# Patient Record
Sex: MALE | URBAN - METROPOLITAN AREA
[De-identification: names, ages, dates, MRNs, and addresses within clinical notes are randomized per-mention and may not be internally consistent; named-entity substitution may affect disease eponyms.]

---
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
## 2019-07-23 NOTE — ED PROVIDER NOTE - GASTROINTESTINAL, MLM
Abdomen soft, non-tender, no guarding. V-Y Flap Text: The defect edges were debeveled with a #15 scalpel blade.  Given the location of the defect, shape of the defect and the proximity to free margins a V-Y flap was deemed most appropriate.  Using a sterile surgical marker, an appropriate advancement flap was drawn incorporating the defect and placing the expected incisions within the relaxed skin tension lines where possible.    The area thus outlined was incised deep to adipose tissue with a #15 scalpel blade.  The skin margins were undermined to an appropriate distance in all directions utilizing iris scissors.

## 2019-09-26 ENCOUNTER — EMERGENCY (EMERGENCY)
Facility: HOSPITAL | Age: 19
LOS: 1 days | Discharge: HOME | End: 2019-09-26
Attending: EMERGENCY MEDICINE | Admitting: EMERGENCY MEDICINE
Payer: MEDICAID

## 2019-09-26 VITALS
HEART RATE: 67 BPM | SYSTOLIC BLOOD PRESSURE: 106 MMHG | HEIGHT: 62 IN | RESPIRATION RATE: 16 BRPM | OXYGEN SATURATION: 99 % | TEMPERATURE: 98 F | WEIGHT: 130.07 LBS | DIASTOLIC BLOOD PRESSURE: 73 MMHG

## 2019-09-26 VITALS
SYSTOLIC BLOOD PRESSURE: 115 MMHG | OXYGEN SATURATION: 100 % | RESPIRATION RATE: 16 BRPM | HEART RATE: 68 BPM | DIASTOLIC BLOOD PRESSURE: 68 MMHG | TEMPERATURE: 97 F

## 2019-09-26 LAB
ALBUMIN SERPL ELPH-MCNC: 4 G/DL — SIGNIFICANT CHANGE UP (ref 3.3–5)
ALP SERPL-CCNC: 110 U/L — SIGNIFICANT CHANGE UP (ref 40–120)
ALT FLD-CCNC: 49 U/L — SIGNIFICANT CHANGE UP (ref 12–78)
ANION GAP SERPL CALC-SCNC: 8 MMOL/L — SIGNIFICANT CHANGE UP (ref 5–17)
APPEARANCE UR: CLEAR — SIGNIFICANT CHANGE UP
AST SERPL-CCNC: 56 U/L — HIGH (ref 15–37)
BASOPHILS # BLD AUTO: 0.1 K/UL — SIGNIFICANT CHANGE UP (ref 0–0.2)
BASOPHILS NFR BLD AUTO: 1.7 % — SIGNIFICANT CHANGE UP (ref 0–2)
BILIRUB SERPL-MCNC: 0.3 MG/DL — SIGNIFICANT CHANGE UP (ref 0.2–1.2)
BILIRUB UR-MCNC: NEGATIVE — SIGNIFICANT CHANGE UP
BUN SERPL-MCNC: 9 MG/DL — SIGNIFICANT CHANGE UP (ref 7–23)
CALCIUM SERPL-MCNC: 8.5 MG/DL — SIGNIFICANT CHANGE UP (ref 8.5–10.1)
CHLORIDE SERPL-SCNC: 106 MMOL/L — SIGNIFICANT CHANGE UP (ref 96–108)
CO2 SERPL-SCNC: 25 MMOL/L — SIGNIFICANT CHANGE UP (ref 22–31)
COLOR SPEC: SIGNIFICANT CHANGE UP
CREAT SERPL-MCNC: 0.64 MG/DL — SIGNIFICANT CHANGE UP (ref 0.5–1.3)
DIFF PNL FLD: NEGATIVE — SIGNIFICANT CHANGE UP
EOSINOPHIL # BLD AUTO: 0.47 K/UL — SIGNIFICANT CHANGE UP (ref 0–0.5)
EOSINOPHIL NFR BLD AUTO: 7.8 % — HIGH (ref 0–6)
GLUCOSE SERPL-MCNC: 94 MG/DL — SIGNIFICANT CHANGE UP (ref 70–99)
GLUCOSE UR QL: NEGATIVE — SIGNIFICANT CHANGE UP
HCT VFR BLD CALC: 40.1 % — SIGNIFICANT CHANGE UP (ref 39–50)
HGB BLD-MCNC: 13.7 G/DL — SIGNIFICANT CHANGE UP (ref 13–17)
IMM GRANULOCYTES NFR BLD AUTO: 0.3 % — SIGNIFICANT CHANGE UP (ref 0–1.5)
KETONES UR-MCNC: NEGATIVE — SIGNIFICANT CHANGE UP
LEUKOCYTE ESTERASE UR-ACNC: NEGATIVE — SIGNIFICANT CHANGE UP
LYMPHOCYTES # BLD AUTO: 2.27 K/UL — SIGNIFICANT CHANGE UP (ref 1–3.3)
LYMPHOCYTES # BLD AUTO: 37.6 % — SIGNIFICANT CHANGE UP (ref 13–44)
MCHC RBC-ENTMCNC: 31.9 PG — SIGNIFICANT CHANGE UP (ref 27–34)
MCHC RBC-ENTMCNC: 34.2 GM/DL — SIGNIFICANT CHANGE UP (ref 32–36)
MCV RBC AUTO: 93.3 FL — SIGNIFICANT CHANGE UP (ref 80–100)
MONOCYTES # BLD AUTO: 0.42 K/UL — SIGNIFICANT CHANGE UP (ref 0–0.9)
MONOCYTES NFR BLD AUTO: 7 % — SIGNIFICANT CHANGE UP (ref 2–14)
NEUTROPHILS # BLD AUTO: 2.76 K/UL — SIGNIFICANT CHANGE UP (ref 1.8–7.4)
NEUTROPHILS NFR BLD AUTO: 45.6 % — SIGNIFICANT CHANGE UP (ref 43–77)
NITRITE UR-MCNC: NEGATIVE — SIGNIFICANT CHANGE UP
NRBC # BLD: 0 /100 WBCS — SIGNIFICANT CHANGE UP (ref 0–0)
PH UR: 7 — SIGNIFICANT CHANGE UP (ref 5–8)
PLATELET # BLD AUTO: 276 K/UL — SIGNIFICANT CHANGE UP (ref 150–400)
POTASSIUM SERPL-MCNC: 4 MMOL/L — SIGNIFICANT CHANGE UP (ref 3.5–5.3)
POTASSIUM SERPL-SCNC: 4 MMOL/L — SIGNIFICANT CHANGE UP (ref 3.5–5.3)
PROT SERPL-MCNC: 7.3 G/DL — SIGNIFICANT CHANGE UP (ref 6–8.3)
PROT UR-MCNC: NEGATIVE — SIGNIFICANT CHANGE UP
RBC # BLD: 4.3 M/UL — SIGNIFICANT CHANGE UP (ref 4.2–5.8)
RBC # FLD: 12.2 % — SIGNIFICANT CHANGE UP (ref 10.3–14.5)
SODIUM SERPL-SCNC: 139 MMOL/L — SIGNIFICANT CHANGE UP (ref 135–145)
SP GR SPEC: 1 — LOW (ref 1.01–1.02)
UROBILINOGEN FLD QL: NEGATIVE — SIGNIFICANT CHANGE UP
WBC # BLD: 6.04 K/UL — SIGNIFICANT CHANGE UP (ref 3.8–10.5)
WBC # FLD AUTO: 6.04 K/UL — SIGNIFICANT CHANGE UP (ref 3.8–10.5)

## 2019-09-26 PROCEDURE — 99284 EMERGENCY DEPT VISIT MOD MDM: CPT

## 2019-09-26 PROCEDURE — 96361 HYDRATE IV INFUSION ADD-ON: CPT

## 2019-09-26 PROCEDURE — 99284 EMERGENCY DEPT VISIT MOD MDM: CPT | Mod: 25

## 2019-09-26 PROCEDURE — 81003 URINALYSIS AUTO W/O SCOPE: CPT

## 2019-09-26 PROCEDURE — 74177 CT ABD & PELVIS W/CONTRAST: CPT | Mod: 26

## 2019-09-26 PROCEDURE — 74177 CT ABD & PELVIS W/CONTRAST: CPT

## 2019-09-26 PROCEDURE — 80053 COMPREHEN METABOLIC PANEL: CPT

## 2019-09-26 PROCEDURE — 96374 THER/PROPH/DIAG INJ IV PUSH: CPT

## 2019-09-26 PROCEDURE — 85027 COMPLETE CBC AUTOMATED: CPT

## 2019-09-26 PROCEDURE — 36415 COLL VENOUS BLD VENIPUNCTURE: CPT

## 2019-09-26 RX ORDER — KETOROLAC TROMETHAMINE 30 MG/ML
30 SYRINGE (ML) INJECTION ONCE
Refills: 0 | Status: DISCONTINUED | OUTPATIENT
Start: 2019-09-26 | End: 2019-09-26

## 2019-09-26 RX ORDER — SODIUM CHLORIDE 9 MG/ML
1000 INJECTION INTRAMUSCULAR; INTRAVENOUS; SUBCUTANEOUS ONCE
Refills: 0 | Status: COMPLETED | OUTPATIENT
Start: 2019-09-26 | End: 2019-09-26

## 2019-09-26 RX ADMIN — SODIUM CHLORIDE 1000 MILLILITER(S): 9 INJECTION INTRAMUSCULAR; INTRAVENOUS; SUBCUTANEOUS at 18:20

## 2019-09-26 RX ADMIN — SODIUM CHLORIDE 1000 MILLILITER(S): 9 INJECTION INTRAMUSCULAR; INTRAVENOUS; SUBCUTANEOUS at 17:20

## 2019-09-26 RX ADMIN — Medication 30 MILLIGRAM(S): at 17:22

## 2019-09-26 RX ADMIN — Medication 30 MILLIGRAM(S): at 17:35

## 2019-09-26 NOTE — ED PROVIDER NOTE - CLINICAL SUMMARY MEDICAL DECISION MAKING FREE TEXT BOX
nonspecific abdominal pain, labs, ct no acute findings, given pain med, symptoms resolved, follow up with pmd and   GI

## 2019-09-26 NOTE — ED PROVIDER NOTE - PROGRESS NOTE DETAILS
patient resting comfortably , results discussed no acute findings, given information for Dr Krishnamurthy, advised follow up with pmd, copy of results given, any concerns return to ED

## 2019-09-26 NOTE — ED ADULT NURSE NOTE - PMH
Fracture of tibia, closed    No pertinent past medical history Blood clot in bladder    Fracture of tibia, closed    No pertinent past medical history

## 2019-09-26 NOTE — ED ADULT NURSE NOTE - CHPI ED NUR SYMPTOMS NEG
no nausea/no hematuria/no blood in stool/no fever/no burning urination/no vomiting/no diarrhea/no chills/no dysuria/no abdominal distension

## 2019-09-26 NOTE — ED PROVIDER NOTE - PATIENT PORTAL LINK FT
You can access the FollowMyHealth Patient Portal offered by [facility] by registering at the following website: [email or web address]. By joining [employer]’s FollowMyHealth portal, you will also be able to view your health information using other applications (apps) compatible with our system.

## 2019-09-26 NOTE — ED PROVIDER NOTE - ATTENDING CONTRIBUTION TO CARE
18 yo M p/w R sided flank / R abd pain today. No fever/chills. No n/v/d. No dysuria / hematuria. Pt with similar kidney ? infxn with similar sx. ? clot in kidney per pt. No neck / back pain. No recent illness, no recent trauma. no agg/allev factors. No other inj or co.  exam: MM Moist. neck suppl.e no meningeal signs. Abd soft, Pos tend R mid / RLQ, no CVAT bl, no edema. No other acute findings.  Check labs, CT with cont, IVF, UA

## 2019-09-26 NOTE — ED PROVIDER NOTE - OBJECTIVE STATEMENT
19 y male presents with abdominal pain, right flank pain,  states pain woke him up from sleep last night, took motrin for pain seemed to help,  then pain returned,  denies fever, nvd, trauma, states he has noted urinary frequency, states he has been drinking water today, denies hematuria.  states he has hx of a "clot" in his kidney last year, states he took medications at that time, denies hx of kidney surgery, denies hx of abdominal surgery, no urologist, nonsmoker, denies etoh.  PMD Dr Pike

## 2019-12-06 ENCOUNTER — EMERGENCY (EMERGENCY)
Facility: HOSPITAL | Age: 19
LOS: 1 days | Discharge: HOME | End: 2019-12-06
Attending: EMERGENCY MEDICINE | Admitting: EMERGENCY MEDICINE
Payer: MEDICAID

## 2019-12-06 VITALS
WEIGHT: 125 LBS | HEIGHT: 62 IN | OXYGEN SATURATION: 96 % | SYSTOLIC BLOOD PRESSURE: 119 MMHG | HEART RATE: 90 BPM | TEMPERATURE: 98 F | DIASTOLIC BLOOD PRESSURE: 75 MMHG | RESPIRATION RATE: 14 BRPM

## 2019-12-06 VITALS
HEART RATE: 84 BPM | TEMPERATURE: 98 F | RESPIRATION RATE: 14 BRPM | OXYGEN SATURATION: 99 % | DIASTOLIC BLOOD PRESSURE: 70 MMHG | SYSTOLIC BLOOD PRESSURE: 120 MMHG

## 2019-12-06 PROBLEM — N32.89 OTHER SPECIFIED DISORDERS OF BLADDER: Chronic | Status: ACTIVE | Noted: 2019-09-26

## 2019-12-06 PROCEDURE — 99283 EMERGENCY DEPT VISIT LOW MDM: CPT

## 2019-12-06 PROCEDURE — 99282 EMERGENCY DEPT VISIT SF MDM: CPT

## 2019-12-06 NOTE — ED PROVIDER NOTE - ATTENDING CONTRIBUTION TO CARE
18 y/o M with URI symptoms for a few weeks.  PE consistent with viral URI.  rhinorrhea, mild pharyngitis.  lungs CTA.  dc home

## 2019-12-06 NOTE — ED PROVIDER NOTE - CLINICAL SUMMARY MEDICAL DECISION MAKING FREE TEXT BOX
20 yo M p/w multiple vague symptoms, all improved, likely viral syndrome, benign exam, well appearing---> d/c for supportive care/hydration and PMD follow up

## 2019-12-06 NOTE — ED ADULT NURSE NOTE - CHPI ED NUR SYMPTOMS NEG
no chills/no pain/no nausea/no decreased eating/drinking/no vomiting/no fever/no dizziness/no weakness/no tingling

## 2019-12-06 NOTE — ED PROVIDER NOTE - NSFOLLOWUPINSTRUCTIONS_ED_ALL_ED_FT
Rest. Make sure to stay well hydrated. Follow up with PMD within the next 2-3 days for further evaluation. Return to ED immediately for new or worsening symptoms.

## 2019-12-06 NOTE — ED PROVIDER NOTE - PATIENT PORTAL LINK FT
You can access the FollowMyHealth Patient Portal offered by [facility] by registering at the following website: [email or web address]. By joining [employer]’s FollowMyHealth portal, you will also be able to view your health information using other applications (apps) compatible with our system.

## 2019-12-06 NOTE — ED PROVIDER NOTE - OBJECTIVE STATEMENT
20 yo M presents to ED for evaluation of URI symptoms x a few weeks. Pt states that symptoms began with sore throat, cough, N/V/D, stomach upset, decreased appetite. Pt saw a PCP a few days ago-  was prescribed Zantac and a Z-audelia. States medication has made him feel better. Reports intermittent chills/sweating over the last few days. Also c/o "fogginess" in his head several days ago but never any headache. Pt currently asymptomatic. No recent travel, no sick contacts. Pt currently denies HA, dizziness, neck pain, chest pain, SOB, cough, fever/chills, abd pain, N/V/D.

## 2019-12-06 NOTE — ED PROVIDER NOTE - CARE PROVIDER_API CALL
Jerzy Light (DO)  Family Medicine  [address] [place] [address]  Phone: [phone]  Fax: [phone]  Follow Up Time:

## 2019-12-08 ENCOUNTER — EMERGENCY (EMERGENCY)
Facility: HOSPITAL | Age: 19
LOS: 1 days | Discharge: HOME | End: 2019-12-08
Attending: EMERGENCY MEDICINE | Admitting: EMERGENCY MEDICINE
Payer: MEDICAID

## 2019-12-08 VITALS
TEMPERATURE: 98 F | SYSTOLIC BLOOD PRESSURE: 113 MMHG | HEIGHT: 62 IN | WEIGHT: 130.07 LBS | RESPIRATION RATE: 16 BRPM | HEART RATE: 94 BPM | OXYGEN SATURATION: 99 % | DIASTOLIC BLOOD PRESSURE: 76 MMHG

## 2019-12-08 LAB
ALBUMIN SERPL ELPH-MCNC: 3.6 G/DL — SIGNIFICANT CHANGE UP (ref 3.3–5)
ALP SERPL-CCNC: 177 U/L — HIGH (ref 40–120)
ALT FLD-CCNC: 100 U/L — HIGH (ref 12–78)
AMPHET UR-MCNC: NEGATIVE — SIGNIFICANT CHANGE UP
ANION GAP SERPL CALC-SCNC: 9 MMOL/L — SIGNIFICANT CHANGE UP (ref 5–17)
APPEARANCE UR: CLEAR — SIGNIFICANT CHANGE UP
AST SERPL-CCNC: 101 U/L — HIGH (ref 15–37)
BARBITURATES UR SCN-MCNC: NEGATIVE — SIGNIFICANT CHANGE UP
BASOPHILS # BLD AUTO: 0.04 K/UL — SIGNIFICANT CHANGE UP (ref 0–0.2)
BASOPHILS NFR BLD AUTO: 0.7 % — SIGNIFICANT CHANGE UP (ref 0–2)
BENZODIAZ UR-MCNC: NEGATIVE — SIGNIFICANT CHANGE UP
BILIRUB SERPL-MCNC: 0.2 MG/DL — SIGNIFICANT CHANGE UP (ref 0.2–1.2)
BILIRUB UR-MCNC: NEGATIVE — SIGNIFICANT CHANGE UP
BUN SERPL-MCNC: 11 MG/DL — SIGNIFICANT CHANGE UP (ref 7–23)
CALCIUM SERPL-MCNC: 9.3 MG/DL — SIGNIFICANT CHANGE UP (ref 8.5–10.1)
CHLORIDE SERPL-SCNC: 100 MMOL/L — SIGNIFICANT CHANGE UP (ref 96–108)
CO2 SERPL-SCNC: 26 MMOL/L — SIGNIFICANT CHANGE UP (ref 22–31)
COCAINE METAB.OTHER UR-MCNC: NEGATIVE — SIGNIFICANT CHANGE UP
COLOR SPEC: YELLOW — SIGNIFICANT CHANGE UP
CREAT SERPL-MCNC: 0.8 MG/DL — SIGNIFICANT CHANGE UP (ref 0.5–1.3)
DIFF PNL FLD: NEGATIVE — SIGNIFICANT CHANGE UP
EOSINOPHIL # BLD AUTO: 0.38 K/UL — SIGNIFICANT CHANGE UP (ref 0–0.5)
EOSINOPHIL NFR BLD AUTO: 6.7 % — HIGH (ref 0–6)
GLUCOSE SERPL-MCNC: 91 MG/DL — SIGNIFICANT CHANGE UP (ref 70–99)
GLUCOSE UR QL: NEGATIVE — SIGNIFICANT CHANGE UP
HCT VFR BLD CALC: 46 % — SIGNIFICANT CHANGE UP (ref 39–50)
HGB BLD-MCNC: 15.1 G/DL — SIGNIFICANT CHANGE UP (ref 13–17)
IMM GRANULOCYTES NFR BLD AUTO: 0.9 % — SIGNIFICANT CHANGE UP (ref 0–1.5)
KETONES UR-MCNC: NEGATIVE — SIGNIFICANT CHANGE UP
LEUKOCYTE ESTERASE UR-ACNC: NEGATIVE — SIGNIFICANT CHANGE UP
LIDOCAIN IGE QN: 193 U/L — SIGNIFICANT CHANGE UP (ref 73–393)
LYMPHOCYTES # BLD AUTO: 1.99 K/UL — SIGNIFICANT CHANGE UP (ref 1–3.3)
LYMPHOCYTES # BLD AUTO: 35.2 % — SIGNIFICANT CHANGE UP (ref 13–44)
MCHC RBC-ENTMCNC: 30.3 PG — SIGNIFICANT CHANGE UP (ref 27–34)
MCHC RBC-ENTMCNC: 32.8 GM/DL — SIGNIFICANT CHANGE UP (ref 32–36)
MCV RBC AUTO: 92.4 FL — SIGNIFICANT CHANGE UP (ref 80–100)
METHADONE UR-MCNC: NEGATIVE — SIGNIFICANT CHANGE UP
MONOCYTES # BLD AUTO: 0.3 K/UL — SIGNIFICANT CHANGE UP (ref 0–0.9)
MONOCYTES NFR BLD AUTO: 5.3 % — SIGNIFICANT CHANGE UP (ref 2–14)
NEUTROPHILS # BLD AUTO: 2.9 K/UL — SIGNIFICANT CHANGE UP (ref 1.8–7.4)
NEUTROPHILS NFR BLD AUTO: 51.2 % — SIGNIFICANT CHANGE UP (ref 43–77)
NITRITE UR-MCNC: NEGATIVE — SIGNIFICANT CHANGE UP
NRBC # BLD: 0 /100 WBCS — SIGNIFICANT CHANGE UP (ref 0–0)
OPIATES UR-MCNC: NEGATIVE — SIGNIFICANT CHANGE UP
PCP SPEC-MCNC: SIGNIFICANT CHANGE UP
PCP UR-MCNC: NEGATIVE — SIGNIFICANT CHANGE UP
PH UR: 6 — SIGNIFICANT CHANGE UP (ref 5–8)
PLATELET # BLD AUTO: 848 K/UL — HIGH (ref 150–400)
POTASSIUM SERPL-MCNC: 4 MMOL/L — SIGNIFICANT CHANGE UP (ref 3.5–5.3)
POTASSIUM SERPL-SCNC: 4 MMOL/L — SIGNIFICANT CHANGE UP (ref 3.5–5.3)
PROT SERPL-MCNC: 9.5 G/DL — HIGH (ref 6–8.3)
PROT UR-MCNC: NEGATIVE — SIGNIFICANT CHANGE UP
RBC # BLD: 4.98 M/UL — SIGNIFICANT CHANGE UP (ref 4.2–5.8)
RBC # FLD: 11.8 % — SIGNIFICANT CHANGE UP (ref 10.3–14.5)
SODIUM SERPL-SCNC: 135 MMOL/L — SIGNIFICANT CHANGE UP (ref 135–145)
SP GR SPEC: 1.01 — SIGNIFICANT CHANGE UP (ref 1.01–1.02)
THC UR QL: POSITIVE — SIGNIFICANT CHANGE UP
UROBILINOGEN FLD QL: NEGATIVE — SIGNIFICANT CHANGE UP
WBC # BLD: 5.66 K/UL — SIGNIFICANT CHANGE UP (ref 3.8–10.5)
WBC # FLD AUTO: 5.66 K/UL — SIGNIFICANT CHANGE UP (ref 3.8–10.5)

## 2019-12-08 PROCEDURE — 99284 EMERGENCY DEPT VISIT MOD MDM: CPT

## 2019-12-08 PROCEDURE — 76705 ECHO EXAM OF ABDOMEN: CPT | Mod: 26

## 2019-12-08 RX ORDER — SODIUM CHLORIDE 9 MG/ML
1000 INJECTION INTRAMUSCULAR; INTRAVENOUS; SUBCUTANEOUS ONCE
Refills: 0 | Status: COMPLETED | OUTPATIENT
Start: 2019-12-08 | End: 2019-12-08

## 2019-12-08 RX ORDER — IOHEXOL 300 MG/ML
30 INJECTION, SOLUTION INTRAVENOUS ONCE
Refills: 0 | Status: COMPLETED | OUTPATIENT
Start: 2019-12-08 | End: 2019-12-08

## 2019-12-08 RX ORDER — ONDANSETRON 8 MG/1
4 TABLET, FILM COATED ORAL ONCE
Refills: 0 | Status: COMPLETED | OUTPATIENT
Start: 2019-12-08 | End: 2019-12-08

## 2019-12-08 RX ORDER — FAMOTIDINE 10 MG/ML
20 INJECTION INTRAVENOUS ONCE
Refills: 0 | Status: COMPLETED | OUTPATIENT
Start: 2019-12-08 | End: 2019-12-08

## 2019-12-08 RX ADMIN — FAMOTIDINE 20 MILLIGRAM(S): 10 INJECTION INTRAVENOUS at 20:43

## 2019-12-08 RX ADMIN — SODIUM CHLORIDE 1000 MILLILITER(S): 9 INJECTION INTRAMUSCULAR; INTRAVENOUS; SUBCUTANEOUS at 21:32

## 2019-12-08 RX ADMIN — SODIUM CHLORIDE 1000 MILLILITER(S): 9 INJECTION INTRAMUSCULAR; INTRAVENOUS; SUBCUTANEOUS at 20:43

## 2019-12-08 RX ADMIN — ONDANSETRON 4 MILLIGRAM(S): 8 TABLET, FILM COATED ORAL at 20:43

## 2019-12-08 RX ADMIN — IOHEXOL 30 MILLILITER(S): 300 INJECTION, SOLUTION INTRAVENOUS at 21:25

## 2019-12-08 RX ADMIN — SODIUM CHLORIDE 1000 MILLILITER(S): 9 INJECTION INTRAMUSCULAR; INTRAVENOUS; SUBCUTANEOUS at 21:33

## 2019-12-08 NOTE — ED PROVIDER NOTE - NSFOLLOWUPINSTRUCTIONS_ED_ALL_ED_FT
1) Follow-up with your Primary Medical Doctor, Dr. Shen, and Dr. Krishnamurthy Call today / next business day for prompt follow-up.  2) Return to Emergency room for any worsening or persistent pain, weakness, fever, or any other concerning symptoms.  3) See attached instruction sheets for additional information, including information regarding signs and symptoms to look out for, reasons to seek immediate care and other important instructions.  4) Follow-up with any specialists as discussed / noted as well.

## 2019-12-08 NOTE — ED PROVIDER NOTE - CARE PROVIDER_API CALL
Angel Hernandez)  ColonRectal Surgery; Surgery  [address] [place] [address]  Phone: [phone]  Fax: [phone]  Follow Up Time:     Matthew Krishnamurthy ()  Internal Medicine  [address] [place] [address]  Phone: [phone]  Fax: [phone]  Follow Up Time:

## 2019-12-08 NOTE — ED PROVIDER NOTE - CLINICAL SUMMARY MEDICAL DECISION MAKING FREE TEXT BOX
abdominal pain, f/u labs, US gall bladder, ct abdomen/pelvis, iv fluids, zofran, pepcid, does not want pain meds at this time

## 2019-12-08 NOTE — ED PROVIDER NOTE - OBJECTIVE STATEMENT
19 male presents to ER c/o abdominal pain, nausea, no vomiting, no diarrhea, states he has been having night sweats but that his temperature has been normal, patient recently finished a 5 day course of antibiotics yesterday, does not recall the name for a sore throat that has now resolved.

## 2019-12-08 NOTE — ED PROVIDER NOTE - PATIENT PORTAL LINK FT
You can access the FollowMyHealth Patient Portal offered by [facility] by registering at the following website: [email or web address]. By joining [employer]’s FollowMyHealth portal, you will also be able to view your health information using other applications (apps) compatible with our system.

## 2019-12-09 VITALS
RESPIRATION RATE: 16 BRPM | TEMPERATURE: 98 F | DIASTOLIC BLOOD PRESSURE: 74 MMHG | HEART RATE: 84 BPM | SYSTOLIC BLOOD PRESSURE: 116 MMHG | OXYGEN SATURATION: 99 %

## 2019-12-09 PROCEDURE — 96375 TX/PRO/DX INJ NEW DRUG ADDON: CPT

## 2019-12-09 PROCEDURE — 74177 CT ABD & PELVIS W/CONTRAST: CPT | Mod: 26

## 2019-12-09 PROCEDURE — 96361 HYDRATE IV INFUSION ADD-ON: CPT

## 2019-12-09 PROCEDURE — 76705 ECHO EXAM OF ABDOMEN: CPT

## 2019-12-09 PROCEDURE — 81003 URINALYSIS AUTO W/O SCOPE: CPT

## 2019-12-09 PROCEDURE — 74177 CT ABD & PELVIS W/CONTRAST: CPT

## 2019-12-09 PROCEDURE — 96374 THER/PROPH/DIAG INJ IV PUSH: CPT | Mod: XU

## 2019-12-09 PROCEDURE — 85027 COMPLETE CBC AUTOMATED: CPT

## 2019-12-09 PROCEDURE — 80053 COMPREHEN METABOLIC PANEL: CPT

## 2019-12-09 PROCEDURE — 83690 ASSAY OF LIPASE: CPT

## 2019-12-09 PROCEDURE — 80307 DRUG TEST PRSMV CHEM ANLYZR: CPT

## 2019-12-09 PROCEDURE — 99284 EMERGENCY DEPT VISIT MOD MDM: CPT | Mod: 25

## 2019-12-09 PROCEDURE — 36415 COLL VENOUS BLD VENIPUNCTURE: CPT

## 2019-12-09 RX ORDER — IBUPROFEN 200 MG
600 TABLET ORAL ONCE
Refills: 0 | Status: COMPLETED | OUTPATIENT
Start: 2019-12-09 | End: 2019-12-09

## 2019-12-09 RX ADMIN — Medication 600 MILLIGRAM(S): at 01:55

## 2019-12-09 RX ADMIN — Medication 600 MILLIGRAM(S): at 02:08

## 2019-12-09 RX ADMIN — SODIUM CHLORIDE 1000 MILLILITER(S): 9 INJECTION INTRAMUSCULAR; INTRAVENOUS; SUBCUTANEOUS at 00:53

## 2019-12-14 ENCOUNTER — EMERGENCY (EMERGENCY)
Facility: HOSPITAL | Age: 19
LOS: 1 days | Discharge: HOME | End: 2019-12-14
Attending: EMERGENCY MEDICINE | Admitting: EMERGENCY MEDICINE
Payer: MEDICAID

## 2019-12-14 VITALS
RESPIRATION RATE: 20 BRPM | WEIGHT: 125 LBS | DIASTOLIC BLOOD PRESSURE: 60 MMHG | OXYGEN SATURATION: 99 % | HEIGHT: 62 IN | SYSTOLIC BLOOD PRESSURE: 98 MMHG | TEMPERATURE: 98 F | HEART RATE: 90 BPM

## 2019-12-14 VITALS
HEART RATE: 70 BPM | OXYGEN SATURATION: 99 % | SYSTOLIC BLOOD PRESSURE: 112 MMHG | RESPIRATION RATE: 17 BRPM | DIASTOLIC BLOOD PRESSURE: 74 MMHG | TEMPERATURE: 97 F

## 2019-12-14 LAB
ALBUMIN SERPL ELPH-MCNC: 3.8 G/DL — SIGNIFICANT CHANGE UP (ref 3.3–5)
ALP SERPL-CCNC: 121 U/L — HIGH (ref 40–120)
ALT FLD-CCNC: 70 U/L — SIGNIFICANT CHANGE UP (ref 12–78)
AMYLASE P1 CFR SERPL: 73 U/L — SIGNIFICANT CHANGE UP (ref 25–125)
ANION GAP SERPL CALC-SCNC: 7 MMOL/L — SIGNIFICANT CHANGE UP (ref 5–17)
APPEARANCE UR: CLEAR — SIGNIFICANT CHANGE UP
APTT BLD: 33.5 SEC — SIGNIFICANT CHANGE UP (ref 28.5–37)
AST SERPL-CCNC: 55 U/L — HIGH (ref 15–37)
BILIRUB SERPL-MCNC: 0.2 MG/DL — SIGNIFICANT CHANGE UP (ref 0.2–1.2)
BILIRUB UR-MCNC: NEGATIVE — SIGNIFICANT CHANGE UP
BUN SERPL-MCNC: 13 MG/DL — SIGNIFICANT CHANGE UP (ref 7–23)
CALCIUM SERPL-MCNC: 9.2 MG/DL — SIGNIFICANT CHANGE UP (ref 8.5–10.1)
CHLORIDE SERPL-SCNC: 102 MMOL/L — SIGNIFICANT CHANGE UP (ref 96–108)
CO2 SERPL-SCNC: 28 MMOL/L — SIGNIFICANT CHANGE UP (ref 22–31)
COLOR SPEC: SIGNIFICANT CHANGE UP
CREAT SERPL-MCNC: 0.72 MG/DL — SIGNIFICANT CHANGE UP (ref 0.5–1.3)
DIFF PNL FLD: NEGATIVE — SIGNIFICANT CHANGE UP
GLUCOSE SERPL-MCNC: 93 MG/DL — SIGNIFICANT CHANGE UP (ref 70–99)
GLUCOSE UR QL: NEGATIVE — SIGNIFICANT CHANGE UP
HCT VFR BLD CALC: 43.5 % — SIGNIFICANT CHANGE UP (ref 39–50)
HGB BLD-MCNC: 14.6 G/DL — SIGNIFICANT CHANGE UP (ref 13–17)
INR BLD: 1.15 RATIO — SIGNIFICANT CHANGE UP (ref 0.88–1.16)
KETONES UR-MCNC: NEGATIVE — SIGNIFICANT CHANGE UP
LEUKOCYTE ESTERASE UR-ACNC: NEGATIVE — SIGNIFICANT CHANGE UP
LIDOCAIN IGE QN: 173 U/L — SIGNIFICANT CHANGE UP (ref 73–393)
MCHC RBC-ENTMCNC: 30.8 PG — SIGNIFICANT CHANGE UP (ref 27–34)
MCHC RBC-ENTMCNC: 33.6 GM/DL — SIGNIFICANT CHANGE UP (ref 32–36)
MCV RBC AUTO: 91.8 FL — SIGNIFICANT CHANGE UP (ref 80–100)
NITRITE UR-MCNC: NEGATIVE — SIGNIFICANT CHANGE UP
NRBC # BLD: 0 /100 WBCS — SIGNIFICANT CHANGE UP (ref 0–0)
PH UR: 6.5 — SIGNIFICANT CHANGE UP (ref 5–8)
PLATELET # BLD AUTO: 826 K/UL — HIGH (ref 150–400)
POTASSIUM SERPL-MCNC: 4.3 MMOL/L — SIGNIFICANT CHANGE UP (ref 3.5–5.3)
POTASSIUM SERPL-SCNC: 4.3 MMOL/L — SIGNIFICANT CHANGE UP (ref 3.5–5.3)
PROT SERPL-MCNC: 8.2 G/DL — SIGNIFICANT CHANGE UP (ref 6–8.3)
PROT UR-MCNC: NEGATIVE — SIGNIFICANT CHANGE UP
PROTHROM AB SERPL-ACNC: 13 SEC — HIGH (ref 10–12.9)
RBC # BLD: 4.74 M/UL — SIGNIFICANT CHANGE UP (ref 4.2–5.8)
RBC # FLD: 12 % — SIGNIFICANT CHANGE UP (ref 10.3–14.5)
SODIUM SERPL-SCNC: 137 MMOL/L — SIGNIFICANT CHANGE UP (ref 135–145)
SP GR SPEC: 1.01 — SIGNIFICANT CHANGE UP (ref 1.01–1.02)
UROBILINOGEN FLD QL: NEGATIVE — SIGNIFICANT CHANGE UP
WBC # BLD: 6.44 K/UL — SIGNIFICANT CHANGE UP (ref 3.8–10.5)
WBC # FLD AUTO: 6.44 K/UL — SIGNIFICANT CHANGE UP (ref 3.8–10.5)

## 2019-12-14 PROCEDURE — 83690 ASSAY OF LIPASE: CPT

## 2019-12-14 PROCEDURE — 82150 ASSAY OF AMYLASE: CPT

## 2019-12-14 PROCEDURE — 99283 EMERGENCY DEPT VISIT LOW MDM: CPT | Mod: 25

## 2019-12-14 PROCEDURE — 85730 THROMBOPLASTIN TIME PARTIAL: CPT

## 2019-12-14 PROCEDURE — 99283 EMERGENCY DEPT VISIT LOW MDM: CPT

## 2019-12-14 PROCEDURE — 74018 RADEX ABDOMEN 1 VIEW: CPT | Mod: 26

## 2019-12-14 PROCEDURE — 96360 HYDRATION IV INFUSION INIT: CPT

## 2019-12-14 PROCEDURE — 85027 COMPLETE CBC AUTOMATED: CPT

## 2019-12-14 PROCEDURE — 80053 COMPREHEN METABOLIC PANEL: CPT

## 2019-12-14 PROCEDURE — 36415 COLL VENOUS BLD VENIPUNCTURE: CPT

## 2019-12-14 PROCEDURE — 74018 RADEX ABDOMEN 1 VIEW: CPT

## 2019-12-14 PROCEDURE — 85379 FIBRIN DEGRADATION QUANT: CPT

## 2019-12-14 PROCEDURE — 85610 PROTHROMBIN TIME: CPT

## 2019-12-14 PROCEDURE — 81003 URINALYSIS AUTO W/O SCOPE: CPT

## 2019-12-14 RX ORDER — MULTIVIT WITH MIN/MFOLATE/K2 340-15/3 G
1 POWDER (GRAM) ORAL ONCE
Refills: 0 | Status: DISCONTINUED | OUTPATIENT
Start: 2019-12-14 | End: 2019-12-18

## 2019-12-14 RX ORDER — AZITHROMYCIN 500 MG/1
0 TABLET, FILM COATED ORAL
Qty: 0 | Refills: 0 | DISCHARGE

## 2019-12-14 RX ORDER — RANITIDINE HYDROCHLORIDE 150 MG/1
1 TABLET, FILM COATED ORAL
Qty: 0 | Refills: 0 | DISCHARGE

## 2019-12-14 RX ORDER — MINERAL OIL
133 OIL (ML) MISCELLANEOUS ONCE
Refills: 0 | Status: DISCONTINUED | OUTPATIENT
Start: 2019-12-14 | End: 2019-12-18

## 2019-12-14 RX ORDER — SODIUM CHLORIDE 9 MG/ML
1000 INJECTION INTRAMUSCULAR; INTRAVENOUS; SUBCUTANEOUS
Refills: 0 | Status: COMPLETED | OUTPATIENT
Start: 2019-12-14 | End: 2019-12-14

## 2019-12-14 RX ADMIN — SODIUM CHLORIDE 2000 MILLILITER(S): 9 INJECTION INTRAMUSCULAR; INTRAVENOUS; SUBCUTANEOUS at 17:29

## 2019-12-14 RX ADMIN — SODIUM CHLORIDE 1000 MILLILITER(S): 9 INJECTION INTRAMUSCULAR; INTRAVENOUS; SUBCUTANEOUS at 16:45

## 2019-12-14 RX ADMIN — SODIUM CHLORIDE 2000 MILLILITER(S): 9 INJECTION INTRAMUSCULAR; INTRAVENOUS; SUBCUTANEOUS at 16:00

## 2019-12-14 NOTE — ED ADULT TRIAGE NOTE - CHIEF COMPLAINT QUOTE
Pt reports that he has abdominal pain since last night and severe constipation, pt states he didn't take anything for the constipation but is also stating that has diarrhea too, he states that his pain is all over his abdomen.

## 2019-12-14 NOTE — ED PROVIDER NOTE - PATIENT PORTAL LINK FT
You can access the FollowMyHealth Patient Portal offered by [facility] by registering at the following website: [email or web address]. By joining [employer]’s FollowMyHealth portal, you will also be able to view your health information using other applications (apps) compatible with our system.

## 2019-12-14 NOTE — ED PROVIDER NOTE - NSFOLLOWUPINSTRUCTIONS_ED_ALL_ED_FT
take mag citrate at home,  prune juice, fleets enema at home.  take bentyl as directed.  return for fever, vomiting,  uncontrolled pain take mag citrate at home,  prune juice, fleets enema at home.  take bentyl as directed.  return for fever, vomiting,  uncontrolled pain.  may take simethicone as directed over the counter for gas.  follow with your primary md this week for elevated platelets

## 2019-12-14 NOTE — ED ADULT NURSE NOTE - OBJECTIVE STATEMENT
patient comes to ED for evaluation of abd pain states its the same pain I had last time pt has left rib upper left abd pain for one month without followup states to see gastroenterologist in 4 weeks states pain is unchanged and never goes away taking Tylenol for pain without relief patient sitting up on stretcher conversing freely with visitor sitting on edge of bed looking down using phone pt denies nausea vomiting diarrhea

## 2019-12-14 NOTE — ED PROVIDER NOTE - CLINICAL SUMMARY MEDICAL DECISION MAKING FREE TEXT BOX
pt with abd pain seen several days ago neg ct, pt with continued pain, gassy , constipation and diarrhea, pt refusing rectal and enema.  will d/c on mag citrate, otc laxitives, bentyl and fu gi monday

## 2019-12-14 NOTE — ED ADULT NURSE NOTE - NSIMPLEMENTINTERV_GEN_ALL_ED
Implemented All Universal Safety Interventions:  [place] to call system. Call bell, personal items and telephone within reach. Instruct patient to call for assistance. Room bathroom lighting operational. Non-slip footwear when patient is off stretcher. Physically safe environment: no spills, clutter or unnecessary equipment. Stretcher in lowest position, wheels locked, appropriate side rails in place.

## 2019-12-14 NOTE — ED PROVIDER NOTE - CARE PROVIDER_API CALL
Matthew Krishnamurthy ()  Internal Medicine  [address] [place] [address]  Phone: [phone]  Fax: [phone]  Follow Up Time:

## 2019-12-26 ENCOUNTER — APPOINTMENT (OUTPATIENT)
Dept: FAMILY MEDICINE | Facility: CLINIC | Age: 19
End: 2019-12-26

## 2020-04-29 NOTE — ED ADULT NURSE NOTE - NSIMPLEMENTINTERV_GEN_ALL_ED
-- DO NOT REPLY / DO NOT REPLY ALL --  -- Message is from the [facility]--    COVID-19 Universal Screening: Negative    Patient is requesting a medication refill - medication is on active medication list    Patient is currently OUT of the requested medication.    Was Medication Pended?  Yes.    Rx Name and Dose:      LORazepam (ATIVAN) 2 MG tablet    Duration: 30 days    Pharmacy  [id number] [address] Sunny Gaines(B)    Patient confirmed the above pharmacy as correct?  Yes    Caller Information       Type Contact Phone    04/29/2020 12:45 PM Phone (Incoming) Daniella Redman (Self) [phone] (M)          Alternative phone number: None Given     Turnaround time given to caller:   \"This message will be sent to [state Provider's name]. The clinical team will fulfill your request as soon as they review your message.\"  
Implemented All Universal Safety Interventions:  [place] to call system. Call bell, personal items and telephone within reach. Instruct patient to call for assistance. Room bathroom lighting operational. Non-slip footwear when patient is off stretcher. Physically safe environment: no spills, clutter or unnecessary equipment. Stretcher in lowest position, wheels locked, appropriate side rails in place.

## 2020-06-10 NOTE — ED ADULT NURSE NOTE - NS ED NURSE LEVEL OF CONSCIOUSNESS ORIENTATION
Oriented - self; Oriented - place; Oriented - time Quality 111:Pneumonia Vaccination Status For Older Adults: Pneumococcal Vaccination Previously Received Additional Notes: ACD Melissa [phone] Quality 226: Preventive Care And Screening: Tobacco Use: Screening And Cessation Intervention: Patient screened for tobacco use and is an ex/non-smoker Detail Level: Generalized Quality 130: Documentation Of Current Medications In The Medical Record: Current Medications Documented Name And Contact Information For Health Care Proxy: Melissa [phone] Quality 47: Advance Care Plan: Advance Care Planning discussed and documented; advance care plan or surrogate decision maker documented in the medical record. Quality 431: Preventive Care And Screening: Unhealthy Alcohol Use - Screening: Patient screened for unhealthy alcohol use using a single question and scores less than 2 times per year

## 2020-10-26 NOTE — ED ADULT NURSE NOTE - NSFALLRSKUNASSIST_ED_ALL_ED
EMMETT Saucedo redid stitching to reinforce permacath and instructed RN to use tegaderm to secure the site. Sterile technique was used to provide care at all times.
no

## 2020-12-17 NOTE — ED PROVIDER NOTE - GENITOURINARY [-], MLM
Consent: The patient's verbal consent was obtained including but not limited to risks of crusting, scabbing, blistering, scarring, darker or lighter pigmentary change, recurrence, incomplete removal and infection. Detail Level: Zone Total Number Of Aks Treated: 2 Number Of Freeze-Thaw Cycles: 2 freeze-thaw cycles Post-Care Instructions: I reviewed with the patient in detail post-care instructions. Patient is to wear sunprotection, and avoid picking at any of the treated lesions. Pt may apply Vaseline to crusted or scabbing areas. Render In Bullet Format When Appropriate: No Duration Of Freeze Thaw-Cycle (Seconds): 10 no hematuria

## 2022-03-02 NOTE — ED PROVIDER NOTE - AREA
mid Cosentyx Counseling:  I discussed with the patient the risks of Cosentyx including but not limited to worsening of Crohn's disease, immunosuppression, allergic reactions and infections.  The patient understands that monitoring is required including a PPD at baseline and must alert us or the primary physician if symptoms of infection or other concerning signs are noted.

## 2022-06-03 NOTE — ED ADULT NURSE NOTE - CAS TRG GENERAL NORM CIRC DET
Strong peripheral pulses Area H Indication Text: Tumors in this location are included in Area H (eyelids, eyebrows, nose, lips, chin, ear, pre-auricular, post-auricular, temple, genitalia, hands, feet, ankles and areola).  Tissue conservation is critical in these anatomic locations.

## 2022-06-09 ENCOUNTER — EMERGENCY (EMERGENCY)
Facility: HOSPITAL | Age: 22
LOS: 1 days | Discharge: HOME | End: 2022-06-09
Attending: EMERGENCY MEDICINE | Admitting: EMERGENCY MEDICINE
Payer: COMMERCIAL

## 2022-06-09 VITALS
HEART RATE: 92 BPM | OXYGEN SATURATION: 97 % | WEIGHT: 130.07 LBS | DIASTOLIC BLOOD PRESSURE: 79 MMHG | TEMPERATURE: 98 F | RESPIRATION RATE: 14 BRPM | SYSTOLIC BLOOD PRESSURE: 119 MMHG | HEIGHT: 62 IN

## 2022-06-09 PROCEDURE — 71250 CT THORAX DX C-: CPT | Mod: 26,MA

## 2022-06-09 PROCEDURE — 99284 EMERGENCY DEPT VISIT MOD MDM: CPT | Mod: 25

## 2022-06-09 PROCEDURE — 71250 CT THORAX DX C-: CPT | Mod: MA

## 2022-06-09 PROCEDURE — 73562 X-RAY EXAM OF KNEE 3: CPT | Mod: 26,LT

## 2022-06-09 PROCEDURE — 73562 X-RAY EXAM OF KNEE 3: CPT

## 2022-06-09 PROCEDURE — 99284 EMERGENCY DEPT VISIT MOD MDM: CPT

## 2022-06-09 RX ORDER — IBUPROFEN 200 MG
600 TABLET ORAL ONCE
Refills: 0 | Status: COMPLETED | OUTPATIENT
Start: 2022-06-09 | End: 2022-06-09

## 2022-06-09 RX ADMIN — Medication 600 MILLIGRAM(S): at 18:40

## 2022-06-09 RX ADMIN — Medication 600 MILLIGRAM(S): at 18:10
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
## 2022-06-09 NOTE — ED PROVIDER NOTE - PATIENT PORTAL LINK FT
You can access the FollowMyHealth Patient Portal offered by [facility] by registering at the following website: [email or web address]. By joining [employer]’s FollowMyHealth portal, you will also be able to view your health information using other applications (apps) compatible with our system.

## 2022-06-09 NOTE — ED PROVIDER NOTE - CARE PROVIDERS DIRECT ADDRESSES
[email or web address]

## 2022-06-09 NOTE — ED PROVIDER NOTE - NSICDXPASTMEDICALHX_GEN_ALL_CORE_FT
PAST MEDICAL HISTORY:  Blood clot in bladder     Fracture of tibia, closed     No pertinent past medical history
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
## 2022-06-09 NOTE — ED PROVIDER NOTE - ENMT, MLM
Airway patent, Nasal mucosa clear. Mouth with normal mucosa. Airway patent, Mouth with normal mucosa. NCAT
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
## 2022-06-09 NOTE — ED PROVIDER NOTE - CARE PROVIDER_API CALL
Jed Morrell)  Orthopaedic Surgery; Sports Medicine  [facility], [address] [place] [address]  Phone: [phone]  Fax: [phone]  Follow Up Time: 1-3 Days
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
## 2023-03-03 NOTE — H&P ADULT - FAMILY HISTORY
[facility] and [address],  Sports Performance and Rehabilitation, [facility] [address]                  [address] Located within [facility],5[address] Floor        Arlene Fierro  Phone: [phone]  Fax: [phone]        Physical Therapy  Cancellation/No-show Note  Patient Name:  Juliana Diaz  :  1959   Date:  3/3/2023  Cancelled visits to date: 3  No-shows to date: 3    For today's appointment patient:  [x]  Cancelled  []  Rescheduled appointment  []  No-show     Reason given by patient:  [x]  Patient ill  []  Conflicting appointment  []  No transportation    []  Conflict with work  []  No reason given  []  Other:     Comments:      Electronically signed by:  Ceci Roper PT
No pertinent family history in first degree relatives

## 2023-03-16 ENCOUNTER — EMERGENCY (EMERGENCY)
Facility: HOSPITAL | Age: 23
LOS: 1 days | Discharge: HOME | End: 2023-03-16
Attending: EMERGENCY MEDICINE
Payer: MEDICAID

## 2023-03-16 VITALS
HEIGHT: 65 IN | DIASTOLIC BLOOD PRESSURE: 89 MMHG | HEART RATE: 93 BPM | RESPIRATION RATE: 20 BRPM | WEIGHT: 149.91 LBS | OXYGEN SATURATION: 98 % | SYSTOLIC BLOOD PRESSURE: 123 MMHG | TEMPERATURE: 98 F

## 2023-03-16 PROCEDURE — 73090 X-RAY EXAM OF FOREARM: CPT

## 2023-03-16 PROCEDURE — 73130 X-RAY EXAM OF HAND: CPT

## 2023-03-16 PROCEDURE — 73130 X-RAY EXAM OF HAND: CPT | Mod: 26,RT

## 2023-03-16 PROCEDURE — 73130 X-RAY EXAM OF HAND: CPT | Mod: 26,77,RT

## 2023-03-16 PROCEDURE — 99284 EMERGENCY DEPT VISIT MOD MDM: CPT

## 2023-03-16 PROCEDURE — 73090 X-RAY EXAM OF FOREARM: CPT | Mod: 26,LT

## 2023-03-16 PROCEDURE — 73110 X-RAY EXAM OF WRIST: CPT | Mod: 26,RT

## 2023-03-16 PROCEDURE — 73120 X-RAY EXAM OF HAND: CPT

## 2023-03-16 PROCEDURE — 99284 EMERGENCY DEPT VISIT MOD MDM: CPT | Mod: 25

## 2023-03-16 PROCEDURE — 73080 X-RAY EXAM OF ELBOW: CPT

## 2023-03-16 PROCEDURE — 73110 X-RAY EXAM OF WRIST: CPT

## 2023-03-16 PROCEDURE — 73080 X-RAY EXAM OF ELBOW: CPT | Mod: 26,RT

## 2023-03-16 RX ORDER — IBUPROFEN 200 MG
600 TABLET ORAL ONCE
Refills: 0 | Status: COMPLETED | OUTPATIENT
Start: 2023-03-16 | End: 2023-03-16

## 2023-03-16 RX ORDER — OXYCODONE HYDROCHLORIDE 5 MG/1
5 TABLET ORAL ONCE
Refills: 0 | Status: DISCONTINUED | OUTPATIENT
Start: 2023-03-16 | End: 2023-03-16

## 2023-03-16 RX ADMIN — OXYCODONE HYDROCHLORIDE 5 MILLIGRAM(S): 5 TABLET ORAL at 22:53

## 2023-03-16 RX ADMIN — Medication 600 MILLIGRAM(S): at 19:55

## 2023-03-16 NOTE — ED PROVIDER NOTE - CLINICAL SUMMARY MEDICAL DECISION MAKING FREE TEXT BOX
NICOLA Whittaker MD: 24 y/o male with no sig PMH presents with c/o R wrist pain 3 nights s/p fall from standing on a stool onto R wrist. Pt sent to urgent care, told he has a fx, placed in removable splint. Pt p/w persistant arm pain. Denies focal numbness/weakness. Plan: XR, pain control, ortho consult

## 2023-03-16 NOTE — ED PROVIDER NOTE - OBJECTIVE STATEMENT
23-year-old male with no significant past medical history presents to the emergency department complaining of persistent right wrist pain.  Patient reports that 3 nights ago he was standing on a stool looking for something in a closet and he accidentally fell off the stool landing on his right arm.  Patient reports that he had immediate pain in the right arm most focal at the wrist and was seen at urgent care told that he had a fracture placed in a aluminum wrist splint.  Patient reports that he has had persistent pain in his arm.  Today followed up with a sports medicine doctor who advised him to come to the ED.  Patient reports that he has been taking Tylenol for pain.  Reports he feels a lot of "stiffness "in his arm as well.  He denies numbness, tingling.  States did not sustain any other injuries from the fall.  Denies head injury or LOC, neck pain, back pain.

## 2023-03-16 NOTE — ED PROVIDER NOTE - DIFFERENTIAL DIAGNOSIS
Ddx includes, however, is not limited to: hand fx, wrist fx, contusion, sprain Differential Diagnosis

## 2023-03-16 NOTE — ED PROVIDER NOTE - NSFOLLOWUPINSTRUCTIONS_ED_ALL_ED_FT
1. Please follow up with your Primary Care Doctor after discharge, bring a copy of your results to follow up appointment     2. Please rest, stay hydrated.  For continued or recurrent pain recommend taking over the counter Tylenol (acetaminophen) 1000mg every 6 hours as needed and/or over the counter Motrin (Ibuprofen/Advil) 600mg every 6 hours as needed. If pain is not relieved with over the counter medications you may take Oxycodone 5mg every 8 hours ( please use with caution as this medication may cause sedation)     3. Follow up with Orthopedic Hand Specialist after discharge in 1 week for reevaluation and continued management. Please see office information below to call and schedule appointment:       Rafi Reed)    Orthopedics    [address] [place] [address]    Phone: [phone]    Fax: [phone]    4. Elevate your right arm when possible. Keep splint clean, dry, and in place at all times until Orthopedic follow up     5. Return to ED for new or worsened symptoms including severe pain, swelling, numbness/tingling, bluish discoloration or any other concerns

## 2023-03-16 NOTE — ED PROVIDER NOTE - PATIENT PORTAL LINK FT
You can access the FollowMyHealth Patient Portal offered by [facility] by registering at the following website: [email or web address]. By joining [employer]’s FollowMyHealth portal, you will also be able to view your health information using other applications (apps) compatible with our system.

## 2023-03-16 NOTE — ED PROVIDER NOTE - PROGRESS NOTE DETAILS
Patient's x-rays revealed acute fractures of the hamate and fourth metacarpal base with questioned anterior dislocation of the pisiform.  Patient was seen and evaluated by orthopedic hand service and placed in splint. Spoke to Ortho Dr. Albarado in regards to plan. Case was discussed with Dr. Reed who advised patient can be DC home with plan to follow-up with him in 1 week.  Patient pain improved after PO Oxycodone.  We will send a short prescription to his preferred pharmacy discussed plan with patient will DC home.  Joanne Valentino PA-C

## 2023-03-16 NOTE — ED PROVIDER NOTE - PHYSICAL EXAMINATION
CONSTITUTIONAL: Patient is awake, alert and oriented x 3. Patient is well appearing and in no acute distress  HEAD: NCAT  NECK: supple, FROM  LUNGS: CTA b/l, no wheezing or rales   HEART: RRR.+S1S2 no murmurs  EXTREMITY: FROM Left upper and lower ext b/l.  RUE: FROM of the shoulder and elbow. There is diffuse swelling to the wrist and hand with diffuse wrist ttp ulnar side > radial side. + ttp to the base of the 3-5th metacarpals. No ttp fingers 1-5. Normal gross sensation throughout RUE w/ strong palpable radial pulse   SKIN: with no rash or lesions  NEURO: No focal deficits

## 2023-03-16 NOTE — ED PROVIDER NOTE - CARE PLAN
Principal Discharge DX:	Fracture, hamate  Goal:	R hamate fx and 4th metacarpal fx  Secondary Diagnosis:	Fx metacarpal   1

## 2023-03-16 NOTE — ED ADULT NURSE NOTE - OBJECTIVE STATEMENT
22 y/o male presents to ED from home c/o R arm pain s/p fall on Tuesday. Pt states he was on stool reaching for something in the closet when he lost balance and fell onto R side. Went to  where they placed cast. Pt states "they didn't give me anything for pain and it's getting worse." Pt arrived with soft cast in place. Denies head injury or LOC.

## 2023-03-16 NOTE — ED PROVIDER NOTE - NS ED ATTENDING STATEMENT MOD
This was a shared visit with the TIMI. I reviewed and verified the documentation and independently performed the documented:

## 2023-03-17 VITALS
SYSTOLIC BLOOD PRESSURE: 131 MMHG | DIASTOLIC BLOOD PRESSURE: 88 MMHG | HEART RATE: 82 BPM | OXYGEN SATURATION: 98 % | TEMPERATURE: 98 F | RESPIRATION RATE: 17 BRPM

## 2023-03-17 RX ORDER — OXYCODONE HYDROCHLORIDE 5 MG/1
1 TABLET ORAL
Qty: 10 | Refills: 0
Start: 2023-03-17 | End: 2023-03-19

## 2023-03-17 NOTE — CONSULT NOTE ADULT - SUBJECTIVE AND OBJECTIVE BOX
23yMale R Hand Dominant presents to ED c/o severe R hand pain s/p fall from chair 3 days ago. Patient denies head hit or LOC. Localizing pain to R hands. Denies radiation of pain. Pt denies numbness, tingling or burning.  Patient denies any other injuries. Pt initially seen at Urgent care and placed in volar slab. Pt now visits [facility] ED for continued pain    PMH:  Fracture of tibia, closed    No pertinent past medical history    Blood clot in bladder      PSH:  Hx of fracture    No significant past surgical history      AH:    Meds: See med rec    T(C): 36.4 (03-16-23 @ 18:25)  HR: 93 (03-16-23 @ 18:25)  BP: 123/89 (03-16-23 @ 18:25)  RR: 20 (03-16-23 @ 18:25)  SpO2: 98% (03-16-23 @ 18:25)  Wt(kg): --    PE R UE:  Skin intact,    + soft tissue swelling, no ecchymosis;   Decreased ROM of Wrist 2/2 pain.   Normal Elbow/Shoulder ROM w/o pain.   + TTP over DR/Ulna. + Rad Pulse 2+/4.   SILT C5-T1,   +AIN/PIN/Ulnar/Radial/Musc/Median,   soft compartments;    L UE / B/L LE:  No bony TTP; Good ROM w/o pain. Able to SLR B/L. Exam Unremarkable.     Imaging:  XRay R Wrist  3 views of R hand demonstrated right base of 4th proximal phalanx fx    .hblock      A/P: 23yMale s/p fall from chair pw right base of 4th proximal phalanx fx  - Pain control  - Strict Ice/Elevation  - NWB RUE with splint    - Keep splint clean/dry/intact;  - Encourage active finger motion to help with swelling  - Pt aware of possible need for surgical intervention o. Will FU as outpatient  - Pt made aware of signs and symptoms of compartment syndrome. Aware of need to contact Doctor / Return to ED if symtoms arise.0  - All Pt's / Family Members questions answered, Pt/family understand plan.  - FU w/ Dr. Reed in 2-3 days.

## 2023-03-20 ENCOUNTER — NON-APPOINTMENT (OUTPATIENT)
Age: 23
End: 2023-03-20

## 2023-03-20 ENCOUNTER — APPOINTMENT (OUTPATIENT)
Dept: ORTHOPEDIC SURGERY | Facility: CLINIC | Age: 23
End: 2023-03-20
Payer: MEDICAID

## 2023-03-20 VITALS
HEIGHT: 62 IN | DIASTOLIC BLOOD PRESSURE: 76 MMHG | WEIGHT: 133 LBS | HEART RATE: 80 BPM | OXYGEN SATURATION: 97 % | BODY MASS INDEX: 24.48 KG/M2 | SYSTOLIC BLOOD PRESSURE: 121 MMHG

## 2023-03-20 PROCEDURE — 99204 OFFICE O/P NEW MOD 45 MIN: CPT | Mod: 25

## 2023-03-20 PROCEDURE — 29075 APPL CST ELBW FNGR SHORT ARM: CPT | Mod: RT

## 2023-03-20 PROCEDURE — 73110 X-RAY EXAM OF WRIST: CPT | Mod: RT

## 2023-03-21 ENCOUNTER — OUTPATIENT (OUTPATIENT)
Dept: OUTPATIENT SERVICES | Facility: HOSPITAL | Age: 23
LOS: 1 days | End: 2023-03-21

## 2023-03-21 VITALS
DIASTOLIC BLOOD PRESSURE: 71 MMHG | HEIGHT: 63 IN | OXYGEN SATURATION: 97 % | RESPIRATION RATE: 15 BRPM | TEMPERATURE: 98 F | WEIGHT: 130.07 LBS | SYSTOLIC BLOOD PRESSURE: 106 MMHG | HEART RATE: 72 BPM

## 2023-03-21 DIAGNOSIS — Z98.890 OTHER SPECIFIED POSTPROCEDURAL STATES: Chronic | ICD-10-CM

## 2023-03-21 DIAGNOSIS — S62.143A DISPLACED FRACTURE OF BODY OF HAMATE [UNCIFORM] BONE, UNSPECIFIED WRIST, INITIAL ENCOUNTER FOR CLOSED FRACTURE: ICD-10-CM

## 2023-03-21 DIAGNOSIS — R09.89 OTHER SPECIFIED SYMPTOMS AND SIGNS INVOLVING THE CIRCULATORY AND RESPIRATORY SYSTEMS: ICD-10-CM

## 2023-03-21 DIAGNOSIS — S62.141B: ICD-10-CM

## 2023-03-21 LAB
ANION GAP SERPL CALC-SCNC: 10 MMOL/L — SIGNIFICANT CHANGE UP (ref 7–14)
BUN SERPL-MCNC: 13 MG/DL — SIGNIFICANT CHANGE UP (ref 7–23)
CALCIUM SERPL-MCNC: 9.4 MG/DL — SIGNIFICANT CHANGE UP (ref 8.4–10.5)
CHLORIDE SERPL-SCNC: 102 MMOL/L — SIGNIFICANT CHANGE UP (ref 98–107)
CO2 SERPL-SCNC: 24 MMOL/L — SIGNIFICANT CHANGE UP (ref 22–31)
CREAT SERPL-MCNC: 0.71 MG/DL — SIGNIFICANT CHANGE UP (ref 0.5–1.3)
EGFR: 131 ML/MIN/1.73M2 — SIGNIFICANT CHANGE UP
GLUCOSE SERPL-MCNC: 81 MG/DL — SIGNIFICANT CHANGE UP (ref 70–99)
HCT VFR BLD CALC: 45.9 % — SIGNIFICANT CHANGE UP (ref 39–50)
HGB BLD-MCNC: 15.3 G/DL — SIGNIFICANT CHANGE UP (ref 13–17)
MCHC RBC-ENTMCNC: 31.1 PG — SIGNIFICANT CHANGE UP (ref 27–34)
MCHC RBC-ENTMCNC: 33.3 GM/DL — SIGNIFICANT CHANGE UP (ref 32–36)
MCV RBC AUTO: 93.3 FL — SIGNIFICANT CHANGE UP (ref 80–100)
NRBC # BLD: 0 /100 WBCS — SIGNIFICANT CHANGE UP (ref 0–0)
NRBC # FLD: 0 K/UL — SIGNIFICANT CHANGE UP (ref 0–0)
PLATELET # BLD AUTO: 329 K/UL — SIGNIFICANT CHANGE UP (ref 150–400)
POTASSIUM SERPL-MCNC: 4.7 MMOL/L — SIGNIFICANT CHANGE UP (ref 3.5–5.3)
POTASSIUM SERPL-SCNC: 4.7 MMOL/L — SIGNIFICANT CHANGE UP (ref 3.5–5.3)
RBC # BLD: 4.92 M/UL — SIGNIFICANT CHANGE UP (ref 4.2–5.8)
RBC # FLD: 11.5 % — SIGNIFICANT CHANGE UP (ref 10.3–14.5)
SODIUM SERPL-SCNC: 136 MMOL/L — SIGNIFICANT CHANGE UP (ref 135–145)
WBC # BLD: 7.13 K/UL — SIGNIFICANT CHANGE UP (ref 3.8–10.5)
WBC # FLD AUTO: 7.13 K/UL — SIGNIFICANT CHANGE UP (ref 3.8–10.5)

## 2023-03-21 NOTE — H&P PST ADULT - MUSCULOSKELETAL COMMENTS
patient s/p right wrist injury- right arm short cast on - pre op diagnosis displaced fracture  right hamate/ right wrist fracture 4th metacarpal fracture

## 2023-03-21 NOTE — H&P PST ADULT - PROBLEM SELECTOR PLAN 1
Patient is tentatively scheduled surgery - right hamate open reduction internal fixation and 4th, 5th carpometacarpal pinning with Dr Rao on 03/23/2023.    Pre-op instructions provided. Pt given verbal and written instructions with teach back on pepcid. Pt verbalized understanding with return demonstration.    CBC, BMP sent

## 2023-03-21 NOTE — H&P PST ADULT - HISTORY OF PRESENT ILLNESS
23 year old male with no pertinent PMH presents to [facility], with pre op diagnosis of displaced fracture right hamate  and 4th metacarpal fracture, for pre op evaluation prior to scheduled surgery- right hamate open reduction internal fixation and 4th, 5th carpometacarpal pinning with Dr aRo.

## 2023-03-21 NOTE — H&P PST ADULT - NS PRO AD PATIENT TYPE
Assessment & Plan   (R21) Rash and nonspecific skin eruption  (primary encounter diagnosis)  Comment: Patient with what seems to be an allergic drug reaction or eruption.  No fevers or chills or torso findings to suggest a more worrisome rash like roseola or rubella, no recent sick contacts.  No lesions in mouth, palms or soles consistent with hand-foot-and-mouth disease.  Baby is nontoxic and well, eating and drinking well.  Discussed bathing baby only once a day, use of cool claws and gently washing the face.  In the night if patient is having trouble would give dose of Benadryl (updated dosing for patient's size was provided).  Family will watch closely for developing symptoms, let me know if there are other questions or concerns.  Plan: diphenhydrAMINE (BENADRYL) 12.5 MG/5ML solution      (Z78.9)  infant  Comment: Family requesting refills, this was provided.  Plan: cholecalciferol (D-VI-SOL, VITAMIN D3) 10         mcg/mL (400 units/mL) LIQD liquid, mineral         oil-hydrophilic petrolatum (AQUAPHOR) external         ointment    Constipation  Chronically been a problem for this small child.  Patient is now starting to eat puréed foods, we discussed pectin containing foods like pears, peaches, prunes.  Advised use of puréed foods over juices to minimize unnecessary added sugar.  Family agreed and had no other questions or concerns      Follow Up  Return in about 4 weeks (around 5/23/2022), or if symptoms worsen or fail to improve.      Denisa Lopez MD        Subjective   Jimena is a 6 month old who presents for the following health issues accompanied by his mother and his father; an  over the phone is used for this visit to fully collect history and elicit symptoms from family.    HPI     Face rash  Maddi has had a lot of rashes that come and go, initially had pretty severe diaper rash that was managed by changing bathing patterns, increasing diaper changing, barrier cream.   With the weather changes has had flares of itchy bumps around the eyes and mouth, last night kept baby up all night as he kept waking up and scratching and crying.  There is no bleeding or open excoriation, when patient is upset at there does seem to be some redness and watering of the eyes.  There have been no fevers or chills, no changes in eating or drinking, no rash on the palms or soles, no rash on the extremities, no rash on the trunk.  No one else in the family has had a similar rash.  There has been no new food exposures.    Constipation  Jimena has had chronic intermittent constipation since he was very small, they have started introducing some puréed foods (not around the time that this I eruption started).    Review of Systems   See HPI      Objective    Temp 98.3  F (36.8  C) (Tympanic)   Wt 9.611 kg (21 lb 3 oz)   95 %ile (Z= 1.60) based on WHO (Boys, 0-2 years) weight-for-age data using vitals from 4/25/2022.     Physical Exam   General alert nontoxic well-appearing baby who is very active, bright  HEENT mucous membranes moist and pink, there are no intraoral lesions, baby teeth this and sucks on a rubber gloved finger without limitations and no sensitive or painful areas in the mouth.  Erythematous papular rash around eyes symmetric, small amount nasolabial fold (see picture).  There is no excoriation, bleeding.  There is no cervical lymphadenopathy.  Sclera are clear there is no conjunctivitis, normal amounts of stool, baby is not crying cannot evaluate tears but mucous membranes do appear moist and he is producing an appropriate amount of drool  Skin there is no rash to torso, arms, legs.  There is no rash or lesions to palms or soles.                     Health Care Proxy (HCP)

## 2023-03-21 NOTE — H&P PST ADULT - ASSESSMENT
23 year old male with no pertinent PMH presents to [facility], with pre op diagnosis of displaced fracture right hamate  and 4th metacarpal fracture, for pre op evaluation prior to scheduled surgery- right hamate open reduction internal fixation and 4th, 5th carpometacarpal pinning with Dr Rao.

## 2023-03-22 ENCOUNTER — TRANSCRIPTION ENCOUNTER (OUTPATIENT)
Age: 23
End: 2023-03-22

## 2023-03-22 VITALS
TEMPERATURE: 98 F | DIASTOLIC BLOOD PRESSURE: 72 MMHG | WEIGHT: 130.07 LBS | OXYGEN SATURATION: 98 % | HEART RATE: 63 BPM | HEIGHT: 63 IN | SYSTOLIC BLOOD PRESSURE: 113 MMHG | RESPIRATION RATE: 18 BRPM

## 2023-03-22 NOTE — ASU DISCHARGE PLAN (ADULT/PEDIATRIC) - NS MD DC FALL RISK RISK
For information on Fall & Injury Prevention, visit: [email or web address] OR  [email or web address] OR  https://www.cdc.gov/steadi/patient.html

## 2023-03-22 NOTE — ASU DISCHARGE PLAN (ADULT/PEDIATRIC) - PROCEDURE
Open reduction percutaneous pinning of right hamate, right 4th, & right 5th metacarpal fractures Open reduction percutaneous pinning of right hamate fx and right 4th & 5th carpometacarpal joints Open reduction internal fixation of right hamate fx, open reduction percutaneous pinning of right 4th & 5th carpometacarpal joints

## 2023-03-22 NOTE — ASU DISCHARGE PLAN (ADULT/PEDIATRIC) - CARE PROVIDER_API CALL
Keyla Rao)  [address], [facility] [address]  [place] [address]  Phone: [phone]  Fax: [phone]  Follow Up Time: 1 week

## 2023-03-22 NOTE — ASU PREOPERATIVE ASSESSMENT, ADULT (IPARK ONLY) - FALL HARM RISK - RISK INTERVENTIONS

## 2023-03-22 NOTE — ASU DISCHARGE PLAN (ADULT/PEDIATRIC) - ASU DC SPECIAL INSTRUCTIONSFT
Please see handout from Dr. Rao for specific instructions including follow up. If you are supposed to take any specific medications postoperatively, they have already been sent to your pharmacy.

## 2023-03-22 NOTE — ASU PREOPERATIVE ASSESSMENT, ADULT (IPARK ONLY) - HEIGHT IN INCHES
Denied Rx, request already responded to by other means.     metFORMIN (GLUCOPHAGE) 500 MG tablet 120 tablet 1 2/11/2022  No   Sig - Route: Take 1 tablet (500 mg) by mouth 2 times daily (with meals) - Oral   Sent to pharmacy as: metFORMIN HCl 500 MG Oral Tablet (GLUCOPHAGE)   Class: E-Prescribe   Order: [id number]   E-Prescribing Status: Receipt confirmed by pharmacy (2/11/2022  5:18 PM CST)         Pallavi Watson RN, BSN Nurse Triage Advisor 3:01 PM 2/12/2022     
3

## 2023-03-23 ENCOUNTER — OUTPATIENT (OUTPATIENT)
Dept: OUTPATIENT SERVICES | Facility: HOSPITAL | Age: 23
LOS: 1 days | Discharge: HOME | End: 2023-03-23
Payer: MEDICAID

## 2023-03-23 ENCOUNTER — APPOINTMENT (OUTPATIENT)
Dept: ORTHOPEDIC SURGERY | Facility: AMBULATORY SURGERY CENTER | Age: 23
End: 2023-03-23

## 2023-03-23 VITALS
HEART RATE: 62 BPM | DIASTOLIC BLOOD PRESSURE: 68 MMHG | OXYGEN SATURATION: 97 % | SYSTOLIC BLOOD PRESSURE: 115 MMHG | RESPIRATION RATE: 13 BRPM

## 2023-03-23 DIAGNOSIS — S62.141B: ICD-10-CM

## 2023-03-23 DIAGNOSIS — Z98.890 OTHER SPECIFIED POSTPROCEDURAL STATES: Chronic | ICD-10-CM

## 2023-03-23 PROCEDURE — 26685 TREAT HAND DISLOCATION: CPT | Mod: F9

## 2023-03-23 PROCEDURE — 25645 OPTX CRPL FX OTH/THN SCPH EA: CPT | Mod: RT

## 2023-03-23 DEVICE — K-WIRE MEDARTIS (SMOOTH) SINGLE TROCAR 1.2MM X 150MM: Type: IMPLANTABLE DEVICE | Site: RIGHT HAND | Status: FUNCTIONAL

## 2023-03-23 DEVICE — SCREW CORT 1.5X10MM: Type: IMPLANTABLE DEVICE | Site: RIGHT HAND | Status: FUNCTIONAL

## 2023-03-23 RX ORDER — OXYCODONE HYDROCHLORIDE 5 MG/1
1 TABLET ORAL
Qty: 0 | Refills: 0 | DISCHARGE

## 2023-03-29 ENCOUNTER — APPOINTMENT (OUTPATIENT)
Dept: ORTHOPEDIC SURGERY | Facility: CLINIC | Age: 23
End: 2023-03-29
Payer: MEDICAID

## 2023-03-29 PROCEDURE — 73110 X-RAY EXAM OF WRIST: CPT | Mod: RT

## 2023-03-29 PROCEDURE — 99024 POSTOP FOLLOW-UP VISIT: CPT

## 2023-03-29 PROCEDURE — 29075 APPL CST ELBW FNGR SHORT ARM: CPT | Mod: RT

## 2023-03-29 RX ORDER — OXYCODONE 5 MG/1
5 TABLET ORAL
Qty: 10 | Refills: 0 | Status: ACTIVE | COMMUNITY
Start: 2023-03-22 | End: 1900-01-01

## 2023-04-13 ENCOUNTER — APPOINTMENT (OUTPATIENT)
Dept: ORTHOPEDIC SURGERY | Facility: CLINIC | Age: 23
End: 2023-04-13
Payer: MEDICAID

## 2023-04-13 PROCEDURE — 73110 X-RAY EXAM OF WRIST: CPT | Mod: RT

## 2023-04-13 PROCEDURE — 99024 POSTOP FOLLOW-UP VISIT: CPT

## 2023-04-13 PROCEDURE — 29075 APPL CST ELBW FNGR SHORT ARM: CPT | Mod: 58,RT

## 2023-04-13 RX ORDER — IBUPROFEN 800 MG/1
800 TABLET, FILM COATED ORAL EVERY 8 HOURS
Qty: 42 | Refills: 0 | Status: ACTIVE | COMMUNITY
Start: 2023-03-22 | End: 1900-01-01

## 2023-04-13 RX ORDER — ACETAMINOPHEN 500 MG/1
500 TABLET ORAL EVERY 6 HOURS
Qty: 56 | Refills: 0 | Status: ACTIVE | COMMUNITY
Start: 2023-03-22 | End: 1900-01-01

## 2023-05-03 ENCOUNTER — APPOINTMENT (OUTPATIENT)
Dept: ORTHOPEDIC SURGERY | Facility: CLINIC | Age: 23
End: 2023-05-03
Payer: MEDICAID

## 2023-05-03 PROCEDURE — 73110 X-RAY EXAM OF WRIST: CPT | Mod: RT

## 2023-05-03 PROCEDURE — 99024 POSTOP FOLLOW-UP VISIT: CPT

## 2023-05-31 ENCOUNTER — APPOINTMENT (OUTPATIENT)
Dept: ORTHOPEDIC SURGERY | Facility: CLINIC | Age: 23
End: 2023-05-31

## 2023-07-20 NOTE — ED PROVIDER NOTE - LOCATION
back Melolabial Transposition Flap Text: The defect edges were debeveled with a #15 scalpel blade.  Given the location of the defect and the proximity to free margins a melolabial flap was deemed most appropriate.  Using a sterile surgical marker, an appropriate melolabial transposition flap was drawn incorporating the defect.    The area thus outlined was incised deep to adipose tissue with a #15 scalpel blade.  The skin margins were undermined to an appropriate distance in all directions utilizing iris scissors.

## 2023-07-22 ENCOUNTER — EMERGENCY (EMERGENCY)
Facility: HOSPITAL | Age: 23
LOS: 1 days | Discharge: HOME | End: 2023-07-22
Attending: EMERGENCY MEDICINE
Payer: MEDICAID

## 2023-07-22 VITALS
OXYGEN SATURATION: 98 % | HEART RATE: 83 BPM | SYSTOLIC BLOOD PRESSURE: 121 MMHG | DIASTOLIC BLOOD PRESSURE: 66 MMHG | RESPIRATION RATE: 17 BRPM

## 2023-07-22 VITALS
OXYGEN SATURATION: 98 % | RESPIRATION RATE: 17 BRPM | SYSTOLIC BLOOD PRESSURE: 118 MMHG | TEMPERATURE: 98 F | DIASTOLIC BLOOD PRESSURE: 86 MMHG | HEART RATE: 87 BPM | WEIGHT: 130.07 LBS

## 2023-07-22 DIAGNOSIS — F43.22 ADJUSTMENT DISORDER WITH ANXIETY: ICD-10-CM

## 2023-07-22 DIAGNOSIS — Z98.890 OTHER SPECIFIED POSTPROCEDURAL STATES: Chronic | ICD-10-CM

## 2023-07-22 LAB
ALBUMIN SERPL ELPH-MCNC: 4.9 G/DL — SIGNIFICANT CHANGE UP (ref 3.3–5)
ALP SERPL-CCNC: 90 U/L — SIGNIFICANT CHANGE UP (ref 40–120)
ALT FLD-CCNC: 17 U/L — SIGNIFICANT CHANGE UP (ref 10–45)
ANION GAP SERPL CALC-SCNC: 16 MMOL/L — SIGNIFICANT CHANGE UP (ref 5–17)
APAP SERPL-MCNC: <15 UG/ML — SIGNIFICANT CHANGE UP (ref 10–30)
AST SERPL-CCNC: 20 U/L — SIGNIFICANT CHANGE UP (ref 10–40)
BASOPHILS # BLD AUTO: 0.04 K/UL — SIGNIFICANT CHANGE UP (ref 0–0.2)
BASOPHILS NFR BLD AUTO: 1 % — SIGNIFICANT CHANGE UP (ref 0–2)
BILIRUB SERPL-MCNC: 0.5 MG/DL — SIGNIFICANT CHANGE UP (ref 0.2–1.2)
BUN SERPL-MCNC: 16 MG/DL — SIGNIFICANT CHANGE UP (ref 7–23)
CALCIUM SERPL-MCNC: 9.1 MG/DL — SIGNIFICANT CHANGE UP (ref 8.4–10.5)
CHLORIDE SERPL-SCNC: 101 MMOL/L — SIGNIFICANT CHANGE UP (ref 96–108)
CO2 SERPL-SCNC: 21 MMOL/L — LOW (ref 22–31)
CREAT SERPL-MCNC: 0.77 MG/DL — SIGNIFICANT CHANGE UP (ref 0.5–1.3)
EGFR: 128 ML/MIN/1.73M2 — SIGNIFICANT CHANGE UP
EOSINOPHIL # BLD AUTO: 0.07 K/UL — SIGNIFICANT CHANGE UP (ref 0–0.5)
EOSINOPHIL NFR BLD AUTO: 1.7 % — SIGNIFICANT CHANGE UP (ref 0–6)
ETHANOL SERPL-MCNC: <10 MG/DL — SIGNIFICANT CHANGE UP (ref 0–10)
GLUCOSE SERPL-MCNC: 96 MG/DL — SIGNIFICANT CHANGE UP (ref 70–99)
HCT VFR BLD CALC: 39.8 % — SIGNIFICANT CHANGE UP (ref 39–50)
HGB BLD-MCNC: 13.8 G/DL — SIGNIFICANT CHANGE UP (ref 13–17)
IMM GRANULOCYTES NFR BLD AUTO: 0.2 % — SIGNIFICANT CHANGE UP (ref 0–0.9)
LYMPHOCYTES # BLD AUTO: 1.1 K/UL — SIGNIFICANT CHANGE UP (ref 1–3.3)
LYMPHOCYTES # BLD AUTO: 26.3 % — SIGNIFICANT CHANGE UP (ref 13–44)
MCHC RBC-ENTMCNC: 32.2 PG — SIGNIFICANT CHANGE UP (ref 27–34)
MCHC RBC-ENTMCNC: 34.7 GM/DL — SIGNIFICANT CHANGE UP (ref 32–36)
MCV RBC AUTO: 92.8 FL — SIGNIFICANT CHANGE UP (ref 80–100)
MONOCYTES # BLD AUTO: 0.32 K/UL — SIGNIFICANT CHANGE UP (ref 0–0.9)
MONOCYTES NFR BLD AUTO: 7.6 % — SIGNIFICANT CHANGE UP (ref 2–14)
NEUTROPHILS # BLD AUTO: 2.65 K/UL — SIGNIFICANT CHANGE UP (ref 1.8–7.4)
NEUTROPHILS NFR BLD AUTO: 63.2 % — SIGNIFICANT CHANGE UP (ref 43–77)
NRBC # BLD: 0 /100 WBCS — SIGNIFICANT CHANGE UP (ref 0–0)
PLATELET # BLD AUTO: 263 K/UL — SIGNIFICANT CHANGE UP (ref 150–400)
POTASSIUM SERPL-MCNC: 3.8 MMOL/L — SIGNIFICANT CHANGE UP (ref 3.5–5.3)
POTASSIUM SERPL-SCNC: 3.8 MMOL/L — SIGNIFICANT CHANGE UP (ref 3.5–5.3)
PROT SERPL-MCNC: 7.5 G/DL — SIGNIFICANT CHANGE UP (ref 6–8.3)
RBC # BLD: 4.29 M/UL — SIGNIFICANT CHANGE UP (ref 4.2–5.8)
RBC # FLD: 11.9 % — SIGNIFICANT CHANGE UP (ref 10.3–14.5)
SALICYLATES SERPL-MCNC: <2 MG/DL — LOW (ref 15–30)
SARS-COV-2 RNA SPEC QL NAA+PROBE: SIGNIFICANT CHANGE UP
SODIUM SERPL-SCNC: 138 MMOL/L — SIGNIFICANT CHANGE UP (ref 135–145)
WBC # BLD: 4.19 K/UL — SIGNIFICANT CHANGE UP (ref 3.8–10.5)
WBC # FLD AUTO: 4.19 K/UL — SIGNIFICANT CHANGE UP (ref 3.8–10.5)

## 2023-07-22 PROCEDURE — 99285 EMERGENCY DEPT VISIT HI MDM: CPT | Mod: 25

## 2023-07-22 PROCEDURE — 93005 ELECTROCARDIOGRAM TRACING: CPT

## 2023-07-22 PROCEDURE — 80307 DRUG TEST PRSMV CHEM ANLYZR: CPT

## 2023-07-22 PROCEDURE — 36415 COLL VENOUS BLD VENIPUNCTURE: CPT

## 2023-07-22 PROCEDURE — 99282 EMERGENCY DEPT VISIT SF MDM: CPT

## 2023-07-22 PROCEDURE — 85025 COMPLETE CBC W/AUTO DIFF WBC: CPT

## 2023-07-22 PROCEDURE — 80053 COMPREHEN METABOLIC PANEL: CPT

## 2023-07-22 PROCEDURE — 87635 SARS-COV-2 COVID-19 AMP PRB: CPT

## 2023-07-22 PROCEDURE — 99285 EMERGENCY DEPT VISIT HI MDM: CPT

## 2023-07-22 NOTE — ED PROVIDER NOTE - PROGRESS NOTE DETAILS
Beau PGY3: psych consult placed by Dr. Root. Agreeable to evaluate the patient. cleared by psych - outpt psych appt set up at [facility]

## 2023-07-22 NOTE — ED PROVIDER NOTE - PATIENT PORTAL LINK FT
You can access the FollowMyHealth Patient Portal offered by [facility] by registering at the following website: [email or web address]. By joining [employer]’s FollowMyHealth portal, you will also be able to view your health information using other applications (apps) compatible with our system.
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
## 2023-07-22 NOTE — ED BEHAVIORAL HEALTH ASSESSMENT NOTE - REFERRAL / APPOINTMENT DETAILS
Provided patient with [facility] with instructions to follow up. Provided patient with [facility] with instructions to follow up. [phone]

## 2023-07-22 NOTE — ED BEHAVIORAL HEALTH ASSESSMENT NOTE - DETAILS
pt denies self Patient was instructed on actions for crisis situations, understood and agreed to follow instructions for handling crisis, including coming to ER or calling 911 should patient or his family/friends feel that he is in danger of hurting self or others.

## 2023-07-22 NOTE — ED ADULT NURSE NOTE - NSFALLLASTSIX_ED_ALL_ED
Called pt. Given message below, pt. Verbalized understanding no further questions.  Pt. States she is doing \" much better\".  
Left message for patient to return call   Please see message.  
Please let pt know that no specific bacteria grew in the culture, just mixed normal skin bacteria.   Please find out if her problem has improved with the antibiotic.   
No.
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
## 2023-07-22 NOTE — ED PROVIDER NOTE - NSFOLLOWUPCLINICS_GEN_ALL_ED_FT
[facility]  [place] [address]  Phone: [phone]  Fax:   Follow Up Time: 7-10 Days

## 2023-07-22 NOTE — ED BEHAVIORAL HEALTH ASSESSMENT NOTE - OTHER
+mild restlessness, no PMR or other abnormal movements. At times odd/intense Tattoos Help seeking behaviors
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
## 2023-07-22 NOTE — ED BEHAVIORAL HEALTH ASSESSMENT NOTE - SAFETY PLAN COMPLETED
SUBJECTIVE: Patient reports missing Saturday visit because of late appointment time. Reports improving pain levels and use of heating pad which made her knee/leg feel better. CAREGIVER INVOLVEMENT/ASSISTANCE NEEDED FOR:  present and assist with ADLs and IADLs as needed  . OBJECTIVE:  See interventions. PATIENT EDUCATION PROVIDED THIS VISIT: education provided on heating pad/ice pack use and alternating both thermo modalities to reduce pain and swelling, education provided on exercise program, education on pain medication management. PATIENT RESPONSE TO EDUCATION PROVIDED: Patient verbalized understanding of education provided. Would benefit from additional reinforcement of HEP for techniques. PATIENT RESPONSE TO TREATMENT: Patient reports increase in from 2/10 to 5/10 pain levels post exercise and educated to use ice pack and pain medication for pain relief. .  ASSESSMENT OF PROGRESS TOWARD GOALS: Patient is making progress toward goals noted by improvement in knee extension ROM versus previous visit from lacking 28 degrees to lacking 11 degrees of knee extension and advancement of HEP. Patient continues to be limited due to increased pain, limited ROM and intermittent symptoms of MS including daily periods of fatigue. Patient would benefit from continued skilled PT to address improving knee ROM, improve independence with mobility, advance to outdoor mobility and step. curb negotiation. Rojas Rodriguez PLAN FOR NEXT VISIT: outdoor mobility, curb negotiation, balance program.     THE FOLLOWING DISCHARGE PLANNING WAS DISCUSSED WITH THE PATIENT/CAREGIVER: 2w1, 1w1 of PT with discharge week of 10/10/22.  MD follow up on 10/11/22 and will continue with outpatient PT.
Yes
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
## 2023-07-22 NOTE — ED ADULT NURSE NOTE - NSFALLUNIVINTERV_ED_ALL_ED
Bed/Stretcher in lowest position, wheels locked, appropriate side rails in place/Call bell, personal items and telephone in reach/Instruct patient to call for assistance before getting out of bed/chair/stretcher/Non-slip footwear applied when patient is off stretcher/[place] to call system/Physically safe environment - no spills, clutter or unnecessary equipment/Purposeful proactive rounding/Room/bathroom lighting operational, light cord in reach
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
## 2023-07-22 NOTE — ED ADULT NURSE NOTE - LIVES WITH, PROFILE
SHIFT SUMMARY
PATIENT HAD NO ACUTE CHANGES OBSERVED THIS SHIFT. PATIENT LESS ANXIOUS. AXO X3
W/CONFUSION. INDEPENDENT IN ROOM AND HALLWAY. CONTINUES TO REPEAT ASKING SAME
QUESTIONS AFTER ANSWER ALREADY GIVEN. NO SUICADAL IDEATION. SITTER 1:1. DENIES
PAIN, SOB, AND N/V. VSS/AFEBRILE. PIV REMAINS INTACT. . IV DECADRON
GIVEN PER EMAR. NOT AS OBSESSED ABOUT GOING HOME. CALL LIGHT IN REACH. BED IN
LOWEST POSITION. WILL CONTINUE TO MONITOR UNTIL DAY SHIFT NURSE ASSUMES CARE. parents

## 2023-07-22 NOTE — ED BEHAVIORAL HEALTH ASSESSMENT NOTE - DESCRIPTION
Pt denies pmhx. Patient calm and cooperative in ED so far, non-lateralizing normal neuro exam and AOx3. CBC and CMP normal, tox negative. EKG and utox pending. Covid neg. Pt unemployed, live with family (parents, sister) in [place]. Born in [place]. Pt unemployed, live with family (parents, sister) in [place]. Close with mother and sister, does not speak with father. Born in [place].

## 2023-07-22 NOTE — ED PROVIDER NOTE - NSFOLLOWUPINSTRUCTIONS_ED_ALL_ED_FT
You came to the Emergency Room because of anxiety.     You were cleared by our psychiatry team to be discharged and follow up outpatient. We do not think that you have any emergency medical condition that require hospital admission at this time.     Please continue your home medications as prescribed by your doctor.     Please follow up with the [facility] within the next 7 days to monitor your symptoms.     Please come back to the Emergency Room if your symptoms get worse.

## 2023-07-22 NOTE — ED PROVIDER NOTE - PHYSICAL EXAMINATION
see mdm see mdm    no si no hi no hallucinations -  anxious   non lateralizing nml neuro exam   aox3    no tremor,   perrl,   nml gait   ,non tachypneic  nontachycardic

## 2023-07-22 NOTE — ED BEHAVIORAL HEALTH ASSESSMENT NOTE - NSBHMSETHTPROC_PSY_A_CORE
At times linear, but often needs to be redirected/Overinclusive/Circumstantial/Impaired reasoning At times linear, but often needs to be redirected/Overinclusive/Circumstantial
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
## 2023-07-22 NOTE — ED BEHAVIORAL HEALTH ASSESSMENT NOTE - NSPRESENTSXS_PSY_ALL_CORE
Impulsivity/Hopelessness or despair/Global insomnia/Severe anxiety, agitation or panic Global insomnia/Severe anxiety, agitation or panic

## 2023-07-22 NOTE — ED BEHAVIORAL HEALTH ASSESSMENT NOTE - RISK ASSESSMENT
Risk factors: anxiety with panic, insomnia, poor frustration tolerance, external stressors/issues with family.     Protective factors: no psych history, no current SIIP/HIIP, no h/o SA/SIB, no h/o psych admissions, no access to weapons, good physical health,, help seeking behavior, future oriented, social supports    Overall, pt is a low risk of harm to self/others and does not meet criteria for psychiatric admission.

## 2023-07-27 NOTE — ED ADULT NURSE NOTE - NS ED NURSE RECORD ANOTHER HT AND WT
[Encouraged to maintain food diary] : Encouraged to maintain food diary [Encouraged to increase physical activity] : Encouraged to increase physical activity [Encouraged to use exercise tracking device] : Encouraged to use exercise tracking device Yes

## 2023-07-31 LAB — DRUG SCREEN, SERUM: SIGNIFICANT CHANGE UP

## 2024-01-16 NOTE — ED ADULT NURSE NOTE - NS ED NURSE RECORD ANOTHER VITAL SIGN
Name: Rosita Lemus  Date: 1/16/24    Referring Physician: No ref. provider found    HISTORY OF PRESENT ILLNESS   Rosita Lemus is a 70 year old female who presents for follow up for diabetes mellitus     Diabetes History:  Diagnosed- around 10 years ago   Patient has not had hospitalizations for blood sugar issues    Prior glycohemoglobin were: 9.5% 4/2023; 7.2% 7/2023; 7.0% 12/2023.     Glucose in clinic today-136 mg/dl     Dietary compliance: Fair- has been trying to decrease intake of soda in the past several months      Recall:  Breakfast- Chick erika sandwich and a few fries and small fruit cup   Lunch- sometimes skips, typically leftovers from dinner meal   Dinner- Protein, with potato, sometimes vegetable   Snack- Sometimes oreo cookies or 1 scoop ice cream   Beverages- water, occ orange crush or tea - unsweetened, or occ. Small can ginger ale or sprite     Exercise: No- some walking- fairly minimal at this time.     Polyuria/polydipsia: No  Blurred vision: No- blurry vision in R eye- macular hole     Episodes of hypoglycemia: Denies symptoms, has not been checking sugars recently     Blood Glucose:    - Not checking recently     Current DM Regimen:  Jardiance- 10mg PO daily   Metformin- 500mg PO daily   Januvia 100mg daily    Modifying factors:  Medication adherence: yes   Recent steroids, illness or infections: no       REVIEW OF SYSTEMS  Eyes: Diabetic retinopathy present: Yes- mild NPDR            Most recent visit to eye doctor in last 12 months: Yes- Dr. Richmond 6/20/2023,     CV: Cardiovascular disease present: Yes- s/p stent x 2 12/2021         Hypertension present: Yes         Hyperlipidemia present: Yes         Peripheral Vascular Disease present: No    : Nephropathy present: Yes    Neuro: Neuropathy present: No, denies symptoms     Skin: Infection or ulceration: No    Osteoporosis: No    Thyroid disease: No      Medications:     Current Outpatient Medications:     empagliflozin 10 MG Oral  Tab, Take 1 tablet (10 mg total) by mouth daily., Disp: , Rfl:     metFORMIN 500 MG Oral Tab, Take 1 tablet (500 mg total) by mouth 2 (two) times daily with meals., Disp: 180 tablet, Rfl: 1    SITagliptin Phosphate (JANUVIA) 50 MG Oral Tab, Take 1 tablet (50 mg total) by mouth daily. (Patient taking differently: Take 2 tablets (100 mg total) by mouth Noon.), Disp: 90 tablet, Rfl: 1    famotidine 10 MG Oral Tab, Take 1 tablet (10 mg total) by mouth daily as needed for Heartburn., Disp: , Rfl:     lisinopril 40 MG Oral Tab, Take 1 tablet (40 mg total) by mouth daily., Disp: 30 tablet, Rfl: 5    Elastic Bandages & Supports (MEDICAL COMPRESSION STOCKINGS) Does not apply Misc, 1 each daily. Knee high 15-20 mmhg wear during the day off at night, Disp: 1 each, Rfl: 0    clopidogrel 75 MG Oral Tab, Take 1 tablet (75 mg total) by mouth daily., Disp: 90 tablet, Rfl: 0    amLODIPine 10 MG Oral Tab, Take 1 tablet (10 mg total) by mouth at bedtime., Disp: , Rfl:     ROSUVASTATIN 40 MG Oral Tab, TAKE ONE TABLET BY MOUTH DAILY, Disp: 90 tablet, Rfl: 0    ACCU-CHEK SOFTCLIX LANCETS Does not apply Misc, TEST BLOOD GLUCOSE ONCE DAILY, Disp: 100 each, Rfl: 3    CARVEDILOL 25 MG Oral Tab, TAKE ONE TABLET BY MOUTH TWICE A DAY WITH MEALS, Disp: 180 tablet, Rfl: 1    aspirin 81 MG Oral Tab EC, Take 1 tablet (81 mg total) by mouth daily., Disp: , Rfl:     Glucose Blood (ACCU-CHEK MILLA PLUS) In Vitro Strip, 2 (two) times a day., Disp: , Rfl:     furosemide 40 MG Oral Tab, Take 1 tablet (40 mg total) by mouth 3 (three) times a week. Pt taking three times a day, Disp: , Rfl:      Allergies:   Allergies   Allergen Reactions    Flu Virus Vaccine OTHER (SEE COMMENTS)     Other reaction(s): Propensity to adverse reactions to drug    Sulfa Antibiotics OTHER (SEE COMMENTS)    Other OTHER (SEE COMMENTS)     wool       Social History:   Social History     Socioeconomic History    Marital status: Single   Tobacco Use    Smoking status: Never     Smokeless tobacco: Never   Vaping Use    Vaping Use: Never used   Substance and Sexual Activity    Alcohol use: Not Currently    Drug use: Never       Medical History:   Past Medical History:   Diagnosis Date    Diabetes (HCC)     Diabetes mellitus (HCC)     Diverticulitis     Essential hypertension     High blood pressure     High cholesterol     Hyperlipidemia     Sleep apnea        Surgical history:   Past Surgical History:   Procedure Laterality Date    ANGIOGRAM N/A 01/07/2022    CATARACT EXTRACTION W/  INTRAOCULAR LENS IMPLANT Right 2017    Dr. Griffin    COLONOSCOPY N/A 6/5/2020    Procedure: COLONOSCOPY;  Surgeon: Ye Ling MD;  Location: [facility] ENDOSCOPY    COLONOSCOPY SCREENING - REFERRAL N/A 12/27/2022    Procedure: COLONOSCOPY-SCREENING;  Surgeon: Ye Ling MD;  Location: [facility] ENDOSCOPY    HYSTERECTOMY      VIT FOR MACULAR HOLE Right 2016    surgery for macular hole in 2016- Dr. Ross?    YAG CAPSULOTOMY - OD - RIGHT EYE Right 2017    Dr. Griffin         PHYSICAL EXAM  Vitals:    01/16/24 0913   BP: 125/68   Pulse: 60   Weight: 237 lb (107.5 kg)   Height: 5' 9.02\" (1.753 m)         General Appearance:  alert, well developed, in no acute distress  Eyes:  normal conjunctivae, sclera, and normal pupils  Neck: Trachea midline: Normal  Back: no kyphosis or back tenderness  Lymph Nodes:  No abnormal nodes noted  Musculoskeletal:  normal muscle strength and tone  Skin:  normal moisture and skin texture  Hair & Nails:  normal scalp hair     Hematologic:  no excessive bruising  Neuro:  sensory grossly intact and motor grossly intact, normal monofilament exam.  Psychiatric:  oriented to time, self, and place  Nutritional:  no abnormal weight gain or loss       ASSESSMENT/PLAN:    Diabetes mellitus type 2 uncontrolled  -HgA1c- 7.0% 12/2023- improved   -Reviewed ABC's of diabetes   - Reviewed pathogenesis of diabetes.   - Reviewed importance of good glycemic control to prevent microvascular  and macrovascular complications including nephropathy, neuropathy, retinopathy, and cardiovascular disease.  - Reviewed importance of SBGM- check glucose 2 times daily   - Reviewed target glucose goals for patient  fasting and <180 post prandially   - Reviewed importance of following diabetic diet- recommended 135 grams of CHO per day or 45 grams per meal.   - Provided patient education materials    - Continue Jardiance 10mg PO daily. Reviewed side effects and risks vs benefits of medication. Reviewed importance of staying well hydrated on medication     -Continue Metformin 500mg PO daily - eGFR 54 12/2023 continue lower dose with decreased kidney function.     -Continue Januvia 100mg PO daily- dose recently increased by PCP. If kidney function declines we will have to reduce dose of Januvia-.    -If drop in renal function and Januvia needs to be decreased would recommend increase in Jardiance in this case. Hold off on increasing today given increased risk of UTI and blood sugars are significantly improved on current medications today.     -normotensive   -lipids at goal 12/2023- on rosuvastatin 40mg daily   -+ nephropathy- CKD- following with nephrology- Saw Dr. Whitehead 7/2023- on lisinopril   -UTD with optho- reviewed importance of optho follow up  -normal foot exam 7/2023- reviewed daily foot exam and foot care  -Reviewed low CHO diet today  - Check sugars 1-2 times daily- alternating fasting and post prandial- Call office if sugars <80 or persistently >180.     RTC in 3 months   1/16/24  MATHIEU Agustin    A total of  30 minutes was spent on obtaining history, reviewing pertinent imaging/labs and specialists notes, evaluating patient, providing multiple treatment options, reinforcing diet/exercise and compliance, and completing documentation.             Yes

## 2024-02-06 NOTE — ED ADULT NURSE NOTE - ED CARDIAC RATE
Patient seen today for follow up wound care visit, visits changed to 2 times weekly tuesday/saturday   Wound Care rle Provided per care plan.   Caregiver involvement: via spouse  Medications reconciled and all medications are available in home  Home health supplies by type and quantity ordered/delivered this visit include:na  Patient education provided this visit: SN educated patient and patient's caregiver on elevating extremities  Patient and patient caregiver verbalizes understanding via the teach back method.   Progress toward goals: progressing well  Home exercise program: continue as ordered   Plan for next visit: wound care management   The following discharge planning was discussed with the patient/ patient's caregiver: DC when goals met normal

## 2024-03-16 ENCOUNTER — EMERGENCY (EMERGENCY)
Facility: HOSPITAL | Age: 24
LOS: 1 days | Discharge: HOME | End: 2024-03-16
Attending: EMERGENCY MEDICINE
Payer: MEDICAID

## 2024-03-16 VITALS
HEART RATE: 71 BPM | RESPIRATION RATE: 16 BRPM | DIASTOLIC BLOOD PRESSURE: 70 MMHG | OXYGEN SATURATION: 98 % | TEMPERATURE: 98 F | SYSTOLIC BLOOD PRESSURE: 112 MMHG

## 2024-03-16 VITALS
WEIGHT: 126.99 LBS | HEART RATE: 72 BPM | HEIGHT: 62 IN | RESPIRATION RATE: 16 BRPM | DIASTOLIC BLOOD PRESSURE: 67 MMHG | OXYGEN SATURATION: 95 % | TEMPERATURE: 97 F | SYSTOLIC BLOOD PRESSURE: 109 MMHG

## 2024-03-16 DIAGNOSIS — Z98.890 OTHER SPECIFIED POSTPROCEDURAL STATES: Chronic | ICD-10-CM

## 2024-03-16 PROCEDURE — 99284 EMERGENCY DEPT VISIT MOD MDM: CPT | Mod: 25

## 2024-03-16 PROCEDURE — 29125 APPL SHORT ARM SPLINT STATIC: CPT | Mod: RT

## 2024-03-16 PROCEDURE — 73110 X-RAY EXAM OF WRIST: CPT | Mod: 26,RT

## 2024-03-16 PROCEDURE — 73130 X-RAY EXAM OF HAND: CPT | Mod: 26,RT

## 2024-03-16 PROCEDURE — 73110 X-RAY EXAM OF WRIST: CPT

## 2024-03-16 PROCEDURE — 73130 X-RAY EXAM OF HAND: CPT

## 2024-03-16 RX ORDER — ACETAMINOPHEN 500 MG
975 TABLET ORAL ONCE
Refills: 0 | Status: COMPLETED | OUTPATIENT
Start: 2024-03-16 | End: 2024-03-16

## 2024-03-16 RX ORDER — IBUPROFEN 200 MG
600 TABLET ORAL ONCE
Refills: 0 | Status: COMPLETED | OUTPATIENT
Start: 2024-03-16 | End: 2024-03-16

## 2024-03-16 RX ADMIN — Medication 975 MILLIGRAM(S): at 13:10

## 2024-03-16 RX ADMIN — Medication 600 MILLIGRAM(S): at 12:31

## 2024-03-16 RX ADMIN — Medication 975 MILLIGRAM(S): at 12:31

## 2024-03-16 RX ADMIN — Medication 600 MILLIGRAM(S): at 13:10

## 2024-03-16 NOTE — ED PROVIDER NOTE - CARE PROVIDERS DIRECT ADDRESSES
[email or web address]

## 2024-03-16 NOTE — ED PROVIDER NOTE - NSFOLLOWUPINSTRUCTIONS_ED_ALL_ED_FT
Please see the information of hand contusion and splint care.    Elevate the affected hand.    Keep the splint clean, dry, and intact.    Take Ibuprofen (600mg every 8hours with food) or Tylenol (2 tablets of 500mg every 8hours) as needed for pain.    Follow up with a hand specialist on a list for reevaluation, call Monday for appointment.    Return for any concerns, fever, chills, numbness, or worsening symptoms.

## 2024-03-16 NOTE — ED PROVIDER NOTE - OBJECTIVE STATEMENT
Attending note.  Patient was seen in room #34 to the right.  Patient is complaining of pain in the right hand and wrist after having a heavy box fall on his extremity yesterday.  Patient reports previous fracture of the right hand 1 year ago.  Patient was supposed to go to rehab, but did not and had residual weakness and stiffness in the wrist.  He denies any numbness or paresthesia.  Reports no other significant past medical history, takes no medications and has no allergies.

## 2024-03-16 NOTE — ED PROVIDER NOTE - CARE PROVIDER_API CALL
Ally Blandon  Plastic Surgery  [address], [address]  [place] [address]  Phone: [phone]  Fax: [phone]  Follow Up Time:     Rosamaria Dorantes  Surgery of the Hand  [address], [address]  [place] [address]  Phone: [phone]  Fax: [phone]  Follow Up Time:     Pablito Ortega  Plastic Surgery  [address] [place] [address]  Phone: [phone]  Fax: [phone]  Follow Up Time:

## 2024-03-16 NOTE — ED PROVIDER NOTE - CLINICAL SUMMARY MEDICAL DECISION MAKING FREE TEXT BOX
Attending note.  Pain and injury to right dominant wrist yesterday with pain, slight swelling and decreased range of motion and without deformity or ecchymosis.  Low suspicion for fracture due to mechanism and physical exam.  X-rays, analgesia.  Volar splint and follow-up with orthopedics/hand.

## 2024-03-16 NOTE — ED PROVIDER NOTE - PHYSICAL EXAMINATION
Attending note.  Patient is alert and in moderate distress.  Examination of the right upper extremity reveals no obvious deformity or ecchymosis.  There is slight swelling over the ulnar side of the wrist and hand.  There is a surgical scar on the dorsum of the hand on the ulnar side.  Patient has tenderness on the dorsal ulnar side of the wrist and ulnar side of the forearm.  He has decreased supination and pronation as well as decreased flexion and extension of the wrist.

## 2024-03-16 NOTE — ED ADULT NURSE NOTE - OBJECTIVE STATEMENT
The pt is a 24y Male with no significant PMH brought in to [facility] for complaints of pain In his right hand. Pt states that he was working with a friend and something heavy fell on his hand. pt denies chest pain, sob, numbness/tingling. Pt is AOx4 breathing evenly and spontaneously on room air and able to speak in full sentences. Pt had limited rom in right hand but had strong pulses and sensation in hands. Mild edema noted to  hand, no bruising present. PT was placed in stretcher with comfort and safety measurers provided

## 2024-03-16 NOTE — ED PROVIDER NOTE - CONDITION AT DISCHARGE:
Patient itchy on new medication liothyronine 5mcg. A lot of headache since starting medication. Patient is stopping medication last dose 6/3.   Satisfactory

## 2024-03-16 NOTE — ED ADULT NURSE NOTE - NSFALLUNIVINTERV_ED_ALL_ED
Bed/Stretcher in lowest position, wheels locked, appropriate side rails in place/Call bell, personal items and telephone in reach/Instruct patient to call for assistance before getting out of bed/chair/stretcher/Non-slip footwear applied when patient is off stretcher/[place] to call system/Physically safe environment - no spills, clutter or unnecessary equipment/Purposeful proactive rounding/Room/bathroom lighting operational, light cord in reach

## 2024-03-16 NOTE — ED PROVIDER NOTE - PATIENT PORTAL LINK FT
You can access the FollowMyHealth Patient Portal offered by [facility] by registering at the following website: [email or web address]. By joining [email or web address]’s FollowMyHealth portal, you will also be able to view your health information using other applications (apps) compatible with our system.

## 2024-03-22 ENCOUNTER — APPOINTMENT (OUTPATIENT)
Dept: ORTHOPEDIC SURGERY | Facility: CLINIC | Age: 24
End: 2024-03-22
Payer: MEDICAID

## 2024-03-22 VITALS — HEIGHT: 62 IN | WEIGHT: 126 LBS | BODY MASS INDEX: 23.19 KG/M2

## 2024-03-22 DIAGNOSIS — S63.054D DISLOCATION OF OTHER CARPOMETACARPAL JOINT OF RIGHT HAND, SUBSEQUENT ENCOUNTER: ICD-10-CM

## 2024-03-22 DIAGNOSIS — S62.141D DISPLACED FRACTURE OF BODY OF HAMATE [UNCIFORM] BONE, RIGHT WRIST, SUBSEQUENT ENCOUNTER FOR FRACTURE WITH ROUTINE HEALING: ICD-10-CM

## 2024-03-22 PROCEDURE — 99203 OFFICE O/P NEW LOW 30 MIN: CPT | Mod: 25

## 2024-03-22 PROCEDURE — 73110 X-RAY EXAM OF WRIST: CPT | Mod: RT

## 2024-03-22 PROCEDURE — 73130 X-RAY EXAM OF HAND: CPT | Mod: RT

## (undated) DEVICE — PACK HAND TRAY

## (undated) DEVICE — GLV 8 PROTEXIS (CREAM) NEU-THERA

## (undated) DEVICE — VENODYNE/SCD SLEEVE CALF MEDIUM

## (undated) DEVICE — WARMING BLANKET LOWER ADULT

## (undated) DEVICE — SUT VICRYL 0 27" CT

## (undated) DEVICE — DRSG DERMABOND 0.7ML

## (undated) DEVICE — DRILL BIT MEDARTIS TWIST 1.2X25MM

## (undated) DEVICE — DRSG STERISTRIPS 0.5 X 4"

## (undated) DEVICE — ELCTR GROUNDING PAD ADULT COVIDIEN

## (undated) DEVICE — DRAPE C ARM UNIVERSAL

## (undated) DEVICE — POSITIONER FOAM EGG CRATE ULNAR 2PCS (PINK)

## (undated) DEVICE — NDL HYPO SAFE 18G X 1.5" (PINK)

## (undated) DEVICE — YELLOW PIN COVER

## (undated) DEVICE — DRSG COBAN 4"

## (undated) DEVICE — POSITIONER PATIENT SAFETY STRAP 3X60"

## (undated) DEVICE — SUT VICRYL 3-0 18" SH (POP-OFF)

## (undated) DEVICE — FRAZIER SUCTION TIP 8FR

## (undated) DEVICE — SYR LUER LOK 10CC

## (undated) DEVICE — SUT MONOCRYL 4-0 18" PS-2

## (undated) DEVICE — SOL IRR POUR NS 0.9% 500ML